# Patient Record
Sex: FEMALE | Race: BLACK OR AFRICAN AMERICAN | Employment: FULL TIME | URBAN - METROPOLITAN AREA
[De-identification: names, ages, dates, MRNs, and addresses within clinical notes are randomized per-mention and may not be internally consistent; named-entity substitution may affect disease eponyms.]

---

## 2017-04-08 ENCOUNTER — EMPLOYEE WELLNESS (OUTPATIENT)
Dept: OTHER | Age: 47
End: 2017-04-08

## 2017-04-08 LAB
CHOLESTEROL, TOTAL: 173 MG/DL (ref 0–199)
FASTING: YES
GLUCOSE BLD-MCNC: 76 MG/DL (ref 74–109)
HDLC SERPL-MCNC: 65 MG/DL (ref 40–90)
LDL CHOLESTEROL CALCULATED: 90 MG/DL
TRIGL SERPL-MCNC: 91 MG/DL (ref 0–199)

## 2018-03-01 ENCOUNTER — OFFICE VISIT (OUTPATIENT)
Dept: ENT CLINIC | Age: 48
End: 2018-03-01
Payer: COMMERCIAL

## 2018-03-01 VITALS
WEIGHT: 153 LBS | RESPIRATION RATE: 16 BRPM | DIASTOLIC BLOOD PRESSURE: 84 MMHG | BODY MASS INDEX: 24.01 KG/M2 | TEMPERATURE: 98.4 F | HEIGHT: 67 IN | SYSTOLIC BLOOD PRESSURE: 126 MMHG | HEART RATE: 78 BPM

## 2018-03-01 DIAGNOSIS — J34.3 HYPERTROPHY OF INFERIOR NASAL TURBINATE: ICD-10-CM

## 2018-03-01 DIAGNOSIS — R09.81 NASAL CONGESTION: Primary | ICD-10-CM

## 2018-03-01 DIAGNOSIS — J34.2 DEVIATED NASAL SEPTUM: ICD-10-CM

## 2018-03-01 DIAGNOSIS — R06.83 SNORING: ICD-10-CM

## 2018-03-01 DIAGNOSIS — J32.9 FREQUENT SINUS INFECTIONS: ICD-10-CM

## 2018-03-01 PROCEDURE — 99203 OFFICE O/P NEW LOW 30 MIN: CPT | Performed by: NURSE PRACTITIONER

## 2018-03-01 RX ORDER — AMOXICILLIN AND CLAVULANATE POTASSIUM 875; 125 MG/1; MG/1
1 TABLET, FILM COATED ORAL 2 TIMES DAILY
Qty: 56 TABLET | Refills: 0 | Status: SHIPPED | OUTPATIENT
Start: 2018-03-01 | End: 2018-03-29

## 2018-03-01 RX ORDER — IBUPROFEN 200 MG
200 TABLET ORAL EVERY 6 HOURS PRN
Status: ON HOLD | COMMUNITY
End: 2018-05-30 | Stop reason: HOSPADM

## 2018-03-01 RX ORDER — LORATADINE 10 MG/1
10 TABLET ORAL DAILY
COMMUNITY
End: 2021-01-14 | Stop reason: ALTCHOICE

## 2018-03-01 ASSESSMENT — ENCOUNTER SYMPTOMS
COUGH: 0
ABDOMINAL PAIN: 0
FACIAL SWELLING: 0
BACK PAIN: 0
CHEST TIGHTNESS: 0
RHINORRHEA: 0
VOMITING: 0
SORE THROAT: 0
EYE PAIN: 0
CHOKING: 0
ABDOMINAL DISTENTION: 0
PHOTOPHOBIA: 0
WHEEZING: 0
EYE DISCHARGE: 0
BLOOD IN STOOL: 0
CONSTIPATION: 0
EYE REDNESS: 0
COLOR CHANGE: 0
SHORTNESS OF BREATH: 0
TROUBLE SWALLOWING: 0
DIARRHEA: 0
ANAL BLEEDING: 0
STRIDOR: 0
VOICE CHANGE: 0
APNEA: 0
EYE ITCHING: 0
RECTAL PAIN: 0
NAUSEA: 0
SINUS PRESSURE: 1

## 2018-03-01 NOTE — PROGRESS NOTES
hypertropy   Discharge:  none    Lips, tongue and oral cavity show tongue is midline, mobile, no lesions. Dentition: good, no malocclusion  Oral mucosa: moist  Tonsils: unremarkable  Oropharynx: normal-appearing mucosa and no pharyngitis, no exudate  Hard and soft palates symmetrical and intact. Uvula midline. Gag reflex present  Nasopharynx: not seen    No facial redness, swelling or tenderness. Salivary glands not enlarged. Neck symmetrical, supple  Cervical adenopathy: no palpable lymphadenopathy  Trachea midline    Chest equal and symmetrical expansion, no retractions    Extremities: no clubbing, cyanosis or edema  Gait steady  Skin: normal exposed surfaces  Cranial nerves grossly intact    Data:  All of the past medical history, past surgical history, family history, social history, allergies and current medications were reviewed. Assessment:   Assessment   1. Nasal congestion  CT Facial Bones WO Contrast   2. Frequent sinus infections  CT Facial Bones WO Contrast   3. Snoring  CT Facial Bones WO Contrast   4. Deviated nasal septum     5. Hypertrophy of inferior nasal turbinate          Plan:        Discussed rationale for treatment of chronic or recurrent sinus infections with prolonged course of broad spectrum antibiotic, nasal steroid spray, and nasal saline irrigation. After 4 week course, will obtain CT sinus and return for appointment with Dr Elliott Tyler. I suspect that if her sinus symptoms clear up, she may still experience nasal obstructive issues due to her deviated septum. Patient is agreeable to plan. Return in about 4 weeks (around 3/29/2018).

## 2018-03-20 VITALS — WEIGHT: 149 LBS | BODY MASS INDEX: 23.34 KG/M2

## 2018-03-30 ENCOUNTER — HOSPITAL ENCOUNTER (OUTPATIENT)
Dept: CT IMAGING | Age: 48
Discharge: HOME | End: 2018-03-30
Payer: COMMERCIAL

## 2018-03-30 DIAGNOSIS — R09.81 NASAL CONGESTION: ICD-10-CM

## 2018-03-30 DIAGNOSIS — R06.83 SNORING: ICD-10-CM

## 2018-03-30 DIAGNOSIS — J32.9 FREQUENT SINUS INFECTIONS: ICD-10-CM

## 2018-03-30 PROCEDURE — 70486 CT MAXILLOFACIAL W/O DYE: CPT

## 2018-04-03 ENCOUNTER — EMPLOYEE WELLNESS (OUTPATIENT)
Dept: OTHER | Age: 48
End: 2018-04-03

## 2018-04-03 LAB
CHOLESTEROL, TOTAL: 188 MG/DL (ref 0–199)
FASTING: YES
GLUCOSE BLD-MCNC: 91 MG/DL (ref 74–109)
HDLC SERPL-MCNC: 62 MG/DL (ref 40–90)
LDL CHOLESTEROL CALCULATED: 99 MG/DL
TRIGL SERPL-MCNC: 137 MG/DL (ref 0–199)

## 2018-04-10 VITALS — WEIGHT: 151 LBS | BODY MASS INDEX: 23.65 KG/M2

## 2018-04-12 ENCOUNTER — OFFICE VISIT (OUTPATIENT)
Dept: ENT CLINIC | Age: 48
End: 2018-04-12
Payer: COMMERCIAL

## 2018-04-12 VITALS
DIASTOLIC BLOOD PRESSURE: 76 MMHG | SYSTOLIC BLOOD PRESSURE: 130 MMHG | RESPIRATION RATE: 16 BRPM | HEIGHT: 67 IN | BODY MASS INDEX: 24.19 KG/M2 | TEMPERATURE: 97.9 F | HEART RATE: 84 BPM | WEIGHT: 154.1 LBS

## 2018-04-12 DIAGNOSIS — J34.2 DEVIATED NASAL SEPTUM: ICD-10-CM

## 2018-04-12 DIAGNOSIS — J34.3 HYPERTROPHY, NASAL, TURBINATE: ICD-10-CM

## 2018-04-12 DIAGNOSIS — J32.9 FREQUENT SINUS INFECTIONS: ICD-10-CM

## 2018-04-12 DIAGNOSIS — R09.81 NASAL CONGESTION: ICD-10-CM

## 2018-04-12 DIAGNOSIS — J32.2 CHRONIC ETHMOIDAL SINUSITIS: Primary | ICD-10-CM

## 2018-04-12 DIAGNOSIS — J32.1 CHRONIC FRONTAL SINUSITIS: ICD-10-CM

## 2018-04-12 DIAGNOSIS — R06.83 SNORING: ICD-10-CM

## 2018-04-12 DIAGNOSIS — J32.0 CHRONIC MAXILLARY SINUSITIS: ICD-10-CM

## 2018-04-12 PROCEDURE — 99214 OFFICE O/P EST MOD 30 MIN: CPT | Performed by: OTOLARYNGOLOGY

## 2018-04-12 ASSESSMENT — ENCOUNTER SYMPTOMS
COUGH: 0
CHEST TIGHTNESS: 0
COLOR CHANGE: 0
FACIAL SWELLING: 0
SHORTNESS OF BREATH: 0
RHINORRHEA: 0
APNEA: 0
VOICE CHANGE: 0
TROUBLE SWALLOWING: 0
NAUSEA: 0
CHOKING: 0
DIARRHEA: 0
VOMITING: 0
STRIDOR: 0
WHEEZING: 0
SINUS PRESSURE: 0
ABDOMINAL PAIN: 0
SORE THROAT: 0

## 2018-05-01 ENCOUNTER — HOSPITAL ENCOUNTER (OUTPATIENT)
Age: 48
Discharge: HOME | End: 2018-05-01
Payer: COMMERCIAL

## 2018-05-01 DIAGNOSIS — J32.0 CHRONIC MAXILLARY SINUSITIS: ICD-10-CM

## 2018-05-01 DIAGNOSIS — J32.2 CHRONIC ETHMOIDAL SINUSITIS: ICD-10-CM

## 2018-05-01 DIAGNOSIS — J32.9 FREQUENT SINUS INFECTIONS: ICD-10-CM

## 2018-05-01 DIAGNOSIS — R06.83 SNORING: ICD-10-CM

## 2018-05-01 DIAGNOSIS — R09.81 NASAL CONGESTION: ICD-10-CM

## 2018-05-01 DIAGNOSIS — J34.2 DEVIATED NASAL SEPTUM: ICD-10-CM

## 2018-05-01 DIAGNOSIS — J32.1 CHRONIC FRONTAL SINUSITIS: ICD-10-CM

## 2018-05-01 DIAGNOSIS — J34.3 HYPERTROPHY, NASAL, TURBINATE: ICD-10-CM

## 2018-05-01 LAB
BASOPHILS # BLD: 0.4 %
BASOPHILS ABSOLUTE: 0 THOU/MM3 (ref 0–0.1)
EOSINOPHIL # BLD: 2.8 %
EOSINOPHILS ABSOLUTE: 0.2 THOU/MM3 (ref 0–0.4)
HCT VFR BLD CALC: 41.2 % (ref 37–47)
HEMOGLOBIN: 14 GM/DL (ref 12–16)
LYMPHOCYTES # BLD: 25.3 %
LYMPHOCYTES ABSOLUTE: 1.7 THOU/MM3 (ref 1–4.8)
MCH RBC QN AUTO: 30 PG (ref 27–31)
MCHC RBC AUTO-ENTMCNC: 33.9 GM/DL (ref 33–37)
MCV RBC AUTO: 88.6 FL (ref 81–99)
MONOCYTES # BLD: 7.6 %
MONOCYTES ABSOLUTE: 0.5 THOU/MM3 (ref 0.4–1.3)
NUCLEATED RED BLOOD CELLS: 0 /100 WBC
PDW BLD-RTO: 13.5 % (ref 11.5–14.5)
PLATELET # BLD: 351 THOU/MM3 (ref 130–400)
PMV BLD AUTO: 7.3 FL (ref 7.4–10.4)
RBC # BLD: 4.65 MILL/MM3 (ref 4.2–5.4)
SEG NEUTROPHILS: 63.9 %
SEGMENTED NEUTROPHILS ABSOLUTE COUNT: 4.2 THOU/MM3 (ref 1.8–7.7)
WBC # BLD: 6.6 THOU/MM3 (ref 4.8–10.8)

## 2018-05-01 PROCEDURE — 36415 COLL VENOUS BLD VENIPUNCTURE: CPT

## 2018-05-01 PROCEDURE — 85025 COMPLETE CBC W/AUTO DIFF WBC: CPT

## 2018-05-21 ENCOUNTER — OFFICE VISIT (OUTPATIENT)
Dept: ENT CLINIC | Age: 48
End: 2018-05-21

## 2018-05-21 VITALS
HEART RATE: 78 BPM | BODY MASS INDEX: 24.06 KG/M2 | SYSTOLIC BLOOD PRESSURE: 120 MMHG | WEIGHT: 153.3 LBS | DIASTOLIC BLOOD PRESSURE: 76 MMHG | HEIGHT: 67 IN | TEMPERATURE: 98.2 F | RESPIRATION RATE: 16 BRPM

## 2018-05-21 DIAGNOSIS — R06.83 SNORING: ICD-10-CM

## 2018-05-21 DIAGNOSIS — R09.81 NASAL CONGESTION: ICD-10-CM

## 2018-05-21 DIAGNOSIS — J32.0 CHRONIC MAXILLARY SINUSITIS: ICD-10-CM

## 2018-05-21 DIAGNOSIS — J34.2 DEVIATED NASAL SEPTUM: ICD-10-CM

## 2018-05-21 DIAGNOSIS — J34.3 HYPERTROPHY, NASAL, TURBINATE: ICD-10-CM

## 2018-05-21 DIAGNOSIS — J32.1 CHRONIC FRONTAL SINUSITIS: ICD-10-CM

## 2018-05-21 DIAGNOSIS — J32.2 CHRONIC ETHMOIDAL SINUSITIS: Primary | ICD-10-CM

## 2018-05-21 PROCEDURE — PREOPEXAM PRE-OP EXAM: Performed by: NURSE PRACTITIONER

## 2018-05-21 ASSESSMENT — ENCOUNTER SYMPTOMS
ANAL BLEEDING: 0
STRIDOR: 0
COLOR CHANGE: 0
EYE PAIN: 0
DIARRHEA: 0
FACIAL SWELLING: 0
BACK PAIN: 0
VOMITING: 0
ABDOMINAL PAIN: 0
EYE DISCHARGE: 0
RECTAL PAIN: 0
EYE REDNESS: 0
PHOTOPHOBIA: 0
VOICE CHANGE: 0
CONSTIPATION: 0
CHEST TIGHTNESS: 0
CHOKING: 0
ABDOMINAL DISTENTION: 0
SORE THROAT: 0
APNEA: 0
NAUSEA: 0
COUGH: 0
WHEEZING: 0
BLOOD IN STOOL: 0
EYE ITCHING: 0
SHORTNESS OF BREATH: 0
SINUS PRESSURE: 0
RHINORRHEA: 0
TROUBLE SWALLOWING: 0

## 2018-05-24 ENCOUNTER — TELEPHONE (OUTPATIENT)
Dept: ENT CLINIC | Age: 48
End: 2018-05-24

## 2018-05-29 ENCOUNTER — TELEPHONE (OUTPATIENT)
Dept: ENT CLINIC | Age: 48
End: 2018-05-29

## 2018-05-30 ENCOUNTER — HOSPITAL ENCOUNTER (OUTPATIENT)
Age: 48
Setting detail: OUTPATIENT SURGERY
Discharge: HOME | End: 2018-05-30
Attending: OTOLARYNGOLOGY | Admitting: OTOLARYNGOLOGY
Payer: COMMERCIAL

## 2018-05-30 ENCOUNTER — ANESTHESIA (OUTPATIENT)
Dept: OPERATING ROOM | Age: 48
End: 2018-05-30
Payer: COMMERCIAL

## 2018-05-30 ENCOUNTER — ANESTHESIA EVENT (OUTPATIENT)
Dept: OPERATING ROOM | Age: 48
End: 2018-05-30
Payer: COMMERCIAL

## 2018-05-30 VITALS
HEIGHT: 67 IN | TEMPERATURE: 97.7 F | RESPIRATION RATE: 18 BRPM | SYSTOLIC BLOOD PRESSURE: 162 MMHG | OXYGEN SATURATION: 97 % | WEIGHT: 152 LBS | DIASTOLIC BLOOD PRESSURE: 99 MMHG | HEART RATE: 98 BPM | BODY MASS INDEX: 23.86 KG/M2

## 2018-05-30 VITALS
TEMPERATURE: 68.2 F | DIASTOLIC BLOOD PRESSURE: 54 MMHG | RESPIRATION RATE: 15 BRPM | OXYGEN SATURATION: 100 % | SYSTOLIC BLOOD PRESSURE: 98 MMHG

## 2018-05-30 DIAGNOSIS — J32.2 CHRONIC ETHMOIDAL SINUSITIS: ICD-10-CM

## 2018-05-30 DIAGNOSIS — J34.2 DEVIATED NASAL SEPTUM: Primary | ICD-10-CM

## 2018-05-30 DIAGNOSIS — J32.1 CHRONIC FRONTAL SINUSITIS: ICD-10-CM

## 2018-05-30 DIAGNOSIS — J34.3 HYPERTROPHY, NASAL, TURBINATE: ICD-10-CM

## 2018-05-30 DIAGNOSIS — J32.0 CHRONIC MAXILLARY SINUSITIS: ICD-10-CM

## 2018-05-30 PROCEDURE — 3700000000 HC ANESTHESIA ATTENDED CARE: Performed by: OTOLARYNGOLOGY

## 2018-05-30 PROCEDURE — 61782 SCAN PROC CRANIAL EXTRA: CPT | Performed by: OTOLARYNGOLOGY

## 2018-05-30 PROCEDURE — 30520 REPAIR OF NASAL SEPTUM: CPT | Performed by: OTOLARYNGOLOGY

## 2018-05-30 PROCEDURE — 3600000014 HC SURGERY LEVEL 4 ADDTL 15MIN: Performed by: OTOLARYNGOLOGY

## 2018-05-30 PROCEDURE — 7100000000 HC PACU RECOVERY - FIRST 15 MIN: Performed by: OTOLARYNGOLOGY

## 2018-05-30 PROCEDURE — 7100000011 HC PHASE II RECOVERY - ADDTL 15 MIN: Performed by: OTOLARYNGOLOGY

## 2018-05-30 PROCEDURE — 2500000003 HC RX 250 WO HCPCS: Performed by: OTOLARYNGOLOGY

## 2018-05-30 PROCEDURE — 7100000001 HC PACU RECOVERY - ADDTL 15 MIN: Performed by: OTOLARYNGOLOGY

## 2018-05-30 PROCEDURE — 6370000000 HC RX 637 (ALT 250 FOR IP): Performed by: SPECIALIST

## 2018-05-30 PROCEDURE — 2580000003 HC RX 258: Performed by: OTOLARYNGOLOGY

## 2018-05-30 PROCEDURE — 6360000002 HC RX W HCPCS: Performed by: ANESTHESIOLOGY

## 2018-05-30 PROCEDURE — 2500000003 HC RX 250 WO HCPCS: Performed by: SPECIALIST

## 2018-05-30 PROCEDURE — 6370000000 HC RX 637 (ALT 250 FOR IP): Performed by: OTOLARYNGOLOGY

## 2018-05-30 PROCEDURE — 6360000002 HC RX W HCPCS: Performed by: OTOLARYNGOLOGY

## 2018-05-30 PROCEDURE — 6360000002 HC RX W HCPCS: Performed by: SPECIALIST

## 2018-05-30 PROCEDURE — 7100000010 HC PHASE II RECOVERY - FIRST 15 MIN: Performed by: OTOLARYNGOLOGY

## 2018-05-30 PROCEDURE — 2580000003 HC RX 258: Performed by: SPECIALIST

## 2018-05-30 PROCEDURE — 31253 NSL/SINS NDSC TOTAL: CPT | Performed by: OTOLARYNGOLOGY

## 2018-05-30 PROCEDURE — 6370000000 HC RX 637 (ALT 250 FOR IP)

## 2018-05-30 PROCEDURE — 3700000001 HC ADD 15 MINUTES (ANESTHESIA): Performed by: OTOLARYNGOLOGY

## 2018-05-30 PROCEDURE — C2625 STENT, NON-COR, TEM W/DEL SY: HCPCS | Performed by: OTOLARYNGOLOGY

## 2018-05-30 PROCEDURE — 88304 TISSUE EXAM BY PATHOLOGIST: CPT

## 2018-05-30 PROCEDURE — 30140 RESECT INFERIOR TURBINATE: CPT | Performed by: OTOLARYNGOLOGY

## 2018-05-30 PROCEDURE — 31020 EXPLORATION MAXILLARY SINUS: CPT | Performed by: OTOLARYNGOLOGY

## 2018-05-30 PROCEDURE — 2780000010 HC IMPLANT OTHER: Performed by: OTOLARYNGOLOGY

## 2018-05-30 PROCEDURE — 3600000004 HC SURGERY LEVEL 4 BASE: Performed by: OTOLARYNGOLOGY

## 2018-05-30 PROCEDURE — 2720000010 HC SURG SUPPLY STERILE: Performed by: OTOLARYNGOLOGY

## 2018-05-30 DEVICE — AGENT HEMOSTATIC SURGIFLOW MATRIX KIT W/THROMBIN: Type: IMPLANTABLE DEVICE | Status: FUNCTIONAL

## 2018-05-30 DEVICE — PROPEL MINI SINUS IMPLANT
Type: IMPLANTABLE DEVICE | Status: FUNCTIONAL
Brand: PROPEL MINI

## 2018-05-30 RX ORDER — ONDANSETRON 2 MG/ML
INJECTION INTRAMUSCULAR; INTRAVENOUS PRN
Status: DISCONTINUED | OUTPATIENT
Start: 2018-05-30 | End: 2018-05-30 | Stop reason: SDUPTHER

## 2018-05-30 RX ORDER — HYDROMORPHONE HCL 110MG/55ML
PATIENT CONTROLLED ANALGESIA SYRINGE INTRAVENOUS PRN
Status: DISCONTINUED | OUTPATIENT
Start: 2018-05-30 | End: 2018-05-30 | Stop reason: SDUPTHER

## 2018-05-30 RX ORDER — PSEUDOEPHEDRINE HYDROCHLORIDE 30 MG/1
30 TABLET ORAL EVERY 6 HOURS
Qty: 56 TABLET | Refills: 0 | Status: SHIPPED | OUTPATIENT
Start: 2018-05-30 | End: 2018-06-13

## 2018-05-30 RX ORDER — HYDROCODONE BITARTRATE AND ACETAMINOPHEN 7.5; 325 MG/1; MG/1
TABLET ORAL
Status: COMPLETED
Start: 2018-05-30 | End: 2018-05-30

## 2018-05-30 RX ORDER — LIDOCAINE HYDROCHLORIDE AND EPINEPHRINE 10; 10 MG/ML; UG/ML
INJECTION, SOLUTION INFILTRATION; PERINEURAL PRN
Status: DISCONTINUED | OUTPATIENT
Start: 2018-05-30 | End: 2018-05-30 | Stop reason: HOSPADM

## 2018-05-30 RX ORDER — PROPOFOL 10 MG/ML
INJECTION, EMULSION INTRAVENOUS PRN
Status: DISCONTINUED | OUTPATIENT
Start: 2018-05-30 | End: 2018-05-30 | Stop reason: SDUPTHER

## 2018-05-30 RX ORDER — GINSENG 100 MG
CAPSULE ORAL PRN
Status: DISCONTINUED | OUTPATIENT
Start: 2018-05-30 | End: 2018-05-30 | Stop reason: HOSPADM

## 2018-05-30 RX ORDER — SODIUM CHLORIDE 9 MG/ML
INJECTION, SOLUTION INTRAVENOUS CONTINUOUS
Status: DISCONTINUED | OUTPATIENT
Start: 2018-05-30 | End: 2018-05-30 | Stop reason: HOSPADM

## 2018-05-30 RX ORDER — ESMOLOL HYDROCHLORIDE 10 MG/ML
INJECTION INTRAVENOUS PRN
Status: DISCONTINUED | OUTPATIENT
Start: 2018-05-30 | End: 2018-05-30 | Stop reason: SDUPTHER

## 2018-05-30 RX ORDER — HYDROCODONE BITARTRATE AND ACETAMINOPHEN 7.5; 325 MG/1; MG/1
1 TABLET ORAL EVERY 6 HOURS PRN
Qty: 20 TABLET | Refills: 0 | Status: SHIPPED | OUTPATIENT
Start: 2018-05-30 | End: 2018-06-04

## 2018-05-30 RX ORDER — DEXAMETHASONE SODIUM PHOSPHATE 4 MG/ML
12 INJECTION, SOLUTION INTRA-ARTICULAR; INTRALESIONAL; INTRAMUSCULAR; INTRAVENOUS; SOFT TISSUE
Status: DISCONTINUED | OUTPATIENT
Start: 2018-05-30 | End: 2018-05-30 | Stop reason: HOSPADM

## 2018-05-30 RX ORDER — DEXAMETHASONE SODIUM PHOSPHATE 4 MG/ML
INJECTION, SOLUTION INTRA-ARTICULAR; INTRALESIONAL; INTRAMUSCULAR; INTRAVENOUS; SOFT TISSUE PRN
Status: DISCONTINUED | OUTPATIENT
Start: 2018-05-30 | End: 2018-05-30 | Stop reason: HOSPADM

## 2018-05-30 RX ORDER — FENTANYL CITRATE 50 UG/ML
50 INJECTION, SOLUTION INTRAMUSCULAR; INTRAVENOUS EVERY 5 MIN PRN
Status: DISCONTINUED | OUTPATIENT
Start: 2018-05-30 | End: 2018-05-30 | Stop reason: HOSPADM

## 2018-05-30 RX ORDER — ROCURONIUM BROMIDE 10 MG/ML
INJECTION, SOLUTION INTRAVENOUS PRN
Status: DISCONTINUED | OUTPATIENT
Start: 2018-05-30 | End: 2018-05-30 | Stop reason: SDUPTHER

## 2018-05-30 RX ORDER — PREDNISONE 20 MG/1
20 TABLET ORAL DAILY
Qty: 4 TABLET | Refills: 0 | Status: SHIPPED | OUTPATIENT
Start: 2018-05-30 | End: 2018-06-02

## 2018-05-30 RX ORDER — MINERAL OIL AND WHITE PETROLATUM 150; 830 MG/G; MG/G
OINTMENT OPHTHALMIC PRN
Status: DISCONTINUED | OUTPATIENT
Start: 2018-05-30 | End: 2018-05-30 | Stop reason: SDUPTHER

## 2018-05-30 RX ORDER — GLYCOPYRROLATE 1 MG/5 ML
SYRINGE (ML) INTRAVENOUS PRN
Status: DISCONTINUED | OUTPATIENT
Start: 2018-05-30 | End: 2018-05-30 | Stop reason: SDUPTHER

## 2018-05-30 RX ORDER — PROMETHAZINE HYDROCHLORIDE 25 MG/ML
12.5 INJECTION, SOLUTION INTRAMUSCULAR; INTRAVENOUS
Status: COMPLETED | OUTPATIENT
Start: 2018-05-30 | End: 2018-05-30

## 2018-05-30 RX ORDER — LABETALOL HYDROCHLORIDE 5 MG/ML
10 INJECTION, SOLUTION INTRAVENOUS EVERY 10 MIN PRN
Status: DISCONTINUED | OUTPATIENT
Start: 2018-05-30 | End: 2018-05-30 | Stop reason: HOSPADM

## 2018-05-30 RX ORDER — MIDAZOLAM HYDROCHLORIDE 1 MG/ML
INJECTION INTRAMUSCULAR; INTRAVENOUS PRN
Status: DISCONTINUED | OUTPATIENT
Start: 2018-05-30 | End: 2018-05-30 | Stop reason: SDUPTHER

## 2018-05-30 RX ORDER — HYDROCODONE BITARTRATE AND ACETAMINOPHEN 7.5; 325 MG/1; MG/1
1 TABLET ORAL EVERY 6 HOURS PRN
Status: DISCONTINUED | OUTPATIENT
Start: 2018-05-30 | End: 2018-05-30 | Stop reason: HOSPADM

## 2018-05-30 RX ORDER — SODIUM CHLORIDE, SODIUM LACTATE, POTASSIUM CHLORIDE, CALCIUM CHLORIDE 600; 310; 30; 20 MG/100ML; MG/100ML; MG/100ML; MG/100ML
INJECTION, SOLUTION INTRAVENOUS CONTINUOUS PRN
Status: DISCONTINUED | OUTPATIENT
Start: 2018-05-30 | End: 2018-05-30 | Stop reason: SDUPTHER

## 2018-05-30 RX ORDER — ROPIVACAINE HYDROCHLORIDE 5 MG/ML
INJECTION, SOLUTION EPIDURAL; INFILTRATION; PERINEURAL PRN
Status: DISCONTINUED | OUTPATIENT
Start: 2018-05-30 | End: 2018-05-30 | Stop reason: HOSPADM

## 2018-05-30 RX ORDER — OXYMETAZOLINE HYDROCHLORIDE 0.05 G/100ML
SPRAY NASAL PRN
Status: DISCONTINUED | OUTPATIENT
Start: 2018-05-30 | End: 2018-05-30 | Stop reason: HOSPADM

## 2018-05-30 RX ORDER — LIDOCAINE HYDROCHLORIDE 20 MG/ML
INJECTION, SOLUTION INFILTRATION; PERINEURAL PRN
Status: DISCONTINUED | OUTPATIENT
Start: 2018-05-30 | End: 2018-05-30 | Stop reason: SDUPTHER

## 2018-05-30 RX ORDER — FENTANYL CITRATE 50 UG/ML
INJECTION, SOLUTION INTRAMUSCULAR; INTRAVENOUS PRN
Status: DISCONTINUED | OUTPATIENT
Start: 2018-05-30 | End: 2018-05-30 | Stop reason: SDUPTHER

## 2018-05-30 RX ORDER — FENTANYL CITRATE 50 UG/ML
25 INJECTION, SOLUTION INTRAMUSCULAR; INTRAVENOUS EVERY 5 MIN PRN
Status: DISCONTINUED | OUTPATIENT
Start: 2018-05-30 | End: 2018-05-30 | Stop reason: HOSPADM

## 2018-05-30 RX ADMIN — ESMOLOL HYDROCHLORIDE 20 MG: 10 INJECTION, SOLUTION INTRAVENOUS at 11:24

## 2018-05-30 RX ADMIN — ROCURONIUM BROMIDE 50 MG: 10 INJECTION, SOLUTION INTRAVENOUS at 11:02

## 2018-05-30 RX ADMIN — ROCURONIUM BROMIDE 10 MG: 10 INJECTION, SOLUTION INTRAVENOUS at 11:56

## 2018-05-30 RX ADMIN — FENTANYL CITRATE 25 MCG: 50 INJECTION, SOLUTION INTRAMUSCULAR; INTRAVENOUS at 16:26

## 2018-05-30 RX ADMIN — HYDROCODONE BITARTRATE AND ACETAMINOPHEN 1 TABLET: 7.5; 325 TABLET ORAL at 18:22

## 2018-05-30 RX ADMIN — FENTANYL CITRATE 50 MCG: 50 INJECTION INTRAMUSCULAR; INTRAVENOUS at 13:05

## 2018-05-30 RX ADMIN — FENTANYL CITRATE 25 MCG: 50 INJECTION, SOLUTION INTRAMUSCULAR; INTRAVENOUS at 16:20

## 2018-05-30 RX ADMIN — Medication 0.2 MG: at 10:54

## 2018-05-30 RX ADMIN — FENTANYL CITRATE 50 MCG: 50 INJECTION, SOLUTION INTRAMUSCULAR; INTRAVENOUS at 16:13

## 2018-05-30 RX ADMIN — SODIUM CHLORIDE: 9 INJECTION, SOLUTION INTRAVENOUS at 10:19

## 2018-05-30 RX ADMIN — PROPOFOL 50 MG: 10 INJECTION, EMULSION INTRAVENOUS at 11:16

## 2018-05-30 RX ADMIN — ONDANSETRON HYDROCHLORIDE 4 MG: 4 INJECTION, SOLUTION INTRAMUSCULAR; INTRAVENOUS at 14:34

## 2018-05-30 RX ADMIN — FENTANYL CITRATE 50 MCG: 50 INJECTION INTRAMUSCULAR; INTRAVENOUS at 10:52

## 2018-05-30 RX ADMIN — PROPOFOL 150 MG: 10 INJECTION, EMULSION INTRAVENOUS at 11:02

## 2018-05-30 RX ADMIN — SODIUM CHLORIDE: 9 INJECTION, SOLUTION INTRAVENOUS at 12:30

## 2018-05-30 RX ADMIN — MIDAZOLAM HYDROCHLORIDE 2 MG: 1 INJECTION, SOLUTION INTRAMUSCULAR; INTRAVENOUS at 10:54

## 2018-05-30 RX ADMIN — MINERAL OIL AND WHITE PETROLATUM 1 INCH: 150; 830 OINTMENT OPHTHALMIC at 11:04

## 2018-05-30 RX ADMIN — FENTANYL CITRATE 50 MCG: 50 INJECTION INTRAMUSCULAR; INTRAVENOUS at 11:02

## 2018-05-30 RX ADMIN — FENTANYL CITRATE 50 MCG: 50 INJECTION INTRAMUSCULAR; INTRAVENOUS at 11:12

## 2018-05-30 RX ADMIN — SODIUM CHLORIDE, POTASSIUM CHLORIDE, SODIUM LACTATE AND CALCIUM CHLORIDE: 600; 310; 30; 20 INJECTION, SOLUTION INTRAVENOUS at 15:30

## 2018-05-30 RX ADMIN — HYDROMORPHONE HYDROCHLORIDE 0.5 MG: 2 INJECTION INTRAMUSCULAR; INTRAVENOUS; SUBCUTANEOUS at 14:42

## 2018-05-30 RX ADMIN — LIDOCAINE HYDROCHLORIDE 100 MG: 20 INJECTION, SOLUTION INFILTRATION; PERINEURAL at 11:02

## 2018-05-30 RX ADMIN — HYDROMORPHONE HYDROCHLORIDE 0.5 MG: 2 INJECTION INTRAMUSCULAR; INTRAVENOUS; SUBCUTANEOUS at 15:05

## 2018-05-30 RX ADMIN — FENTANYL CITRATE 50 MCG: 50 INJECTION INTRAMUSCULAR; INTRAVENOUS at 11:57

## 2018-05-30 RX ADMIN — ESMOLOL HYDROCHLORIDE 10 MG: 10 INJECTION, SOLUTION INTRAVENOUS at 11:37

## 2018-05-30 RX ADMIN — DEXAMETHASONE SODIUM PHOSPHATE 12 MG: 4 INJECTION, SOLUTION INTRA-ARTICULAR; INTRALESIONAL; INTRAMUSCULAR; INTRAVENOUS; SOFT TISSUE at 10:47

## 2018-05-30 RX ADMIN — PROMETHAZINE HYDROCHLORIDE 12.5 MG: 25 INJECTION INTRAMUSCULAR; INTRAVENOUS at 17:33

## 2018-05-30 ASSESSMENT — PULMONARY FUNCTION TESTS
PIF_VALUE: 15
PIF_VALUE: 21
PIF_VALUE: 22
PIF_VALUE: 21
PIF_VALUE: 21
PIF_VALUE: 22
PIF_VALUE: 20
PIF_VALUE: 22
PIF_VALUE: 20
PIF_VALUE: 21
PIF_VALUE: 22
PIF_VALUE: 21
PIF_VALUE: 22
PIF_VALUE: 22
PIF_VALUE: 21
PIF_VALUE: 15
PIF_VALUE: 4
PIF_VALUE: 21
PIF_VALUE: 15
PIF_VALUE: 22
PIF_VALUE: 21
PIF_VALUE: 22
PIF_VALUE: 21
PIF_VALUE: 16
PIF_VALUE: 19
PIF_VALUE: 22
PIF_VALUE: 20
PIF_VALUE: 21
PIF_VALUE: 22
PIF_VALUE: 21
PIF_VALUE: 20
PIF_VALUE: 0
PIF_VALUE: 19
PIF_VALUE: 21
PIF_VALUE: 22
PIF_VALUE: 20
PIF_VALUE: 15
PIF_VALUE: 21
PIF_VALUE: 22
PIF_VALUE: 21
PIF_VALUE: 16
PIF_VALUE: 16
PIF_VALUE: 21
PIF_VALUE: 15
PIF_VALUE: 21
PIF_VALUE: 0
PIF_VALUE: 21
PIF_VALUE: 21
PIF_VALUE: 20
PIF_VALUE: 21
PIF_VALUE: 22
PIF_VALUE: 21
PIF_VALUE: 15
PIF_VALUE: 21
PIF_VALUE: 22
PIF_VALUE: 21
PIF_VALUE: 20
PIF_VALUE: 4
PIF_VALUE: 21
PIF_VALUE: 22
PIF_VALUE: 15
PIF_VALUE: 15
PIF_VALUE: 21
PIF_VALUE: 21
PIF_VALUE: 22
PIF_VALUE: 15
PIF_VALUE: 19
PIF_VALUE: 21
PIF_VALUE: 21
PIF_VALUE: 20
PIF_VALUE: 21
PIF_VALUE: 15
PIF_VALUE: 21
PIF_VALUE: 19
PIF_VALUE: 10
PIF_VALUE: 21
PIF_VALUE: 15
PIF_VALUE: 21
PIF_VALUE: 21
PIF_VALUE: 20
PIF_VALUE: 21
PIF_VALUE: 22
PIF_VALUE: 21
PIF_VALUE: 10
PIF_VALUE: 21
PIF_VALUE: 16
PIF_VALUE: 21
PIF_VALUE: 15
PIF_VALUE: 20
PIF_VALUE: 21
PIF_VALUE: 20
PIF_VALUE: 15
PIF_VALUE: 21
PIF_VALUE: 21
PIF_VALUE: 15
PIF_VALUE: 21
PIF_VALUE: 15
PIF_VALUE: 20
PIF_VALUE: 22
PIF_VALUE: 21
PIF_VALUE: 21
PIF_VALUE: 22
PIF_VALUE: 19
PIF_VALUE: 15
PIF_VALUE: 22
PIF_VALUE: 15
PIF_VALUE: 19
PIF_VALUE: 15
PIF_VALUE: 21
PIF_VALUE: 22
PIF_VALUE: 22
PIF_VALUE: 16
PIF_VALUE: 9
PIF_VALUE: 21
PIF_VALUE: 20
PIF_VALUE: 21
PIF_VALUE: 15
PIF_VALUE: 19
PIF_VALUE: 21
PIF_VALUE: 20
PIF_VALUE: 16
PIF_VALUE: 21
PIF_VALUE: 15
PIF_VALUE: 21
PIF_VALUE: 21
PIF_VALUE: 20
PIF_VALUE: 20
PIF_VALUE: 15
PIF_VALUE: 22
PIF_VALUE: 15
PIF_VALUE: 15
PIF_VALUE: 21
PIF_VALUE: 15
PIF_VALUE: 20
PIF_VALUE: 22
PIF_VALUE: 19
PIF_VALUE: 21
PIF_VALUE: 20
PIF_VALUE: 21
PIF_VALUE: 15
PIF_VALUE: 21
PIF_VALUE: 22
PIF_VALUE: 21
PIF_VALUE: 22
PIF_VALUE: 21
PIF_VALUE: 17
PIF_VALUE: 21
PIF_VALUE: 16
PIF_VALUE: 21
PIF_VALUE: 22
PIF_VALUE: 22
PIF_VALUE: 21
PIF_VALUE: 15
PIF_VALUE: 21
PIF_VALUE: 15
PIF_VALUE: 21
PIF_VALUE: 21
PIF_VALUE: 15
PIF_VALUE: 21
PIF_VALUE: 16
PIF_VALUE: 22
PIF_VALUE: 15
PIF_VALUE: 21
PIF_VALUE: 21
PIF_VALUE: 15
PIF_VALUE: 21
PIF_VALUE: 22
PIF_VALUE: 21
PIF_VALUE: 22
PIF_VALUE: 21
PIF_VALUE: 21
PIF_VALUE: 22
PIF_VALUE: 22
PIF_VALUE: 21
PIF_VALUE: 15
PIF_VALUE: 15
PIF_VALUE: 22
PIF_VALUE: 21
PIF_VALUE: 22
PIF_VALUE: 21
PIF_VALUE: 20
PIF_VALUE: 15
PIF_VALUE: 15
PIF_VALUE: 21
PIF_VALUE: 20
PIF_VALUE: 22
PIF_VALUE: 21
PIF_VALUE: 1
PIF_VALUE: 21
PIF_VALUE: 21
PIF_VALUE: 20
PIF_VALUE: 21
PIF_VALUE: 22
PIF_VALUE: 21
PIF_VALUE: 20
PIF_VALUE: 21
PIF_VALUE: 16
PIF_VALUE: 23
PIF_VALUE: 21
PIF_VALUE: 15
PIF_VALUE: 20
PIF_VALUE: 21
PIF_VALUE: 21
PIF_VALUE: 15
PIF_VALUE: 16
PIF_VALUE: 21
PIF_VALUE: 19
PIF_VALUE: 21
PIF_VALUE: 22
PIF_VALUE: 21
PIF_VALUE: 22
PIF_VALUE: 21
PIF_VALUE: 21
PIF_VALUE: 22
PIF_VALUE: 22
PIF_VALUE: 21

## 2018-05-30 ASSESSMENT — PAIN SCALES - GENERAL
PAINLEVEL_OUTOF10: 5
PAINLEVEL_OUTOF10: 3
PAINLEVEL_OUTOF10: 7
PAINLEVEL_OUTOF10: 7
PAINLEVEL_OUTOF10: 6
PAINLEVEL_OUTOF10: 5

## 2018-05-30 ASSESSMENT — PAIN DESCRIPTION - PAIN TYPE: TYPE: SURGICAL PAIN

## 2018-05-31 ENCOUNTER — OFFICE VISIT (OUTPATIENT)
Dept: ENT CLINIC | Age: 48
End: 2018-05-31

## 2018-05-31 VITALS
TEMPERATURE: 97.9 F | HEART RATE: 96 BPM | WEIGHT: 153.3 LBS | DIASTOLIC BLOOD PRESSURE: 76 MMHG | BODY MASS INDEX: 24.06 KG/M2 | RESPIRATION RATE: 16 BRPM | SYSTOLIC BLOOD PRESSURE: 110 MMHG | HEIGHT: 67 IN

## 2018-05-31 DIAGNOSIS — J32.0 CHRONIC MAXILLARY SINUSITIS: ICD-10-CM

## 2018-05-31 DIAGNOSIS — J34.2 DEVIATED NASAL SEPTUM: ICD-10-CM

## 2018-05-31 DIAGNOSIS — J32.1 CHRONIC FRONTAL SINUSITIS: ICD-10-CM

## 2018-05-31 DIAGNOSIS — J32.2 CHRONIC ETHMOIDAL SINUSITIS: Primary | ICD-10-CM

## 2018-05-31 DIAGNOSIS — J34.3 HYPERTROPHY OF INFERIOR NASAL TURBINATE: ICD-10-CM

## 2018-05-31 DIAGNOSIS — J34.3 HYPERTROPHY, NASAL, TURBINATE: ICD-10-CM

## 2018-05-31 PROCEDURE — 99024 POSTOP FOLLOW-UP VISIT: CPT | Performed by: OTOLARYNGOLOGY

## 2018-05-31 ASSESSMENT — ENCOUNTER SYMPTOMS
STRIDOR: 0
NAUSEA: 0
SINUS PRESSURE: 0
APNEA: 0
FACIAL SWELLING: 0
CHEST TIGHTNESS: 0
SORE THROAT: 0
WHEEZING: 0
COUGH: 0
COLOR CHANGE: 0
RHINORRHEA: 0
SHORTNESS OF BREATH: 0
VOICE CHANGE: 0
DIARRHEA: 0
TROUBLE SWALLOWING: 0
VOMITING: 0
ABDOMINAL PAIN: 0
CHOKING: 0

## 2018-06-07 ENCOUNTER — OFFICE VISIT (OUTPATIENT)
Dept: ENT CLINIC | Age: 48
End: 2018-06-07

## 2018-06-07 VITALS
HEART RATE: 76 BPM | RESPIRATION RATE: 16 BRPM | WEIGHT: 153.2 LBS | TEMPERATURE: 97.8 F | DIASTOLIC BLOOD PRESSURE: 74 MMHG | BODY MASS INDEX: 23.99 KG/M2 | SYSTOLIC BLOOD PRESSURE: 122 MMHG

## 2018-06-07 DIAGNOSIS — J32.2 CHRONIC ETHMOIDAL SINUSITIS: ICD-10-CM

## 2018-06-07 DIAGNOSIS — J32.0 CHRONIC MAXILLARY SINUSITIS: ICD-10-CM

## 2018-06-07 DIAGNOSIS — J34.2 DEVIATED NASAL SEPTUM: Primary | ICD-10-CM

## 2018-06-07 DIAGNOSIS — J34.3 HYPERTROPHY, NASAL, TURBINATE: ICD-10-CM

## 2018-06-07 DIAGNOSIS — J32.1 CHRONIC FRONTAL SINUSITIS: ICD-10-CM

## 2018-06-07 PROCEDURE — 99024 POSTOP FOLLOW-UP VISIT: CPT | Performed by: OTOLARYNGOLOGY

## 2018-06-07 ASSESSMENT — ENCOUNTER SYMPTOMS
ABDOMINAL PAIN: 0
COUGH: 0
WHEEZING: 0
COLOR CHANGE: 0
CHOKING: 0
VOICE CHANGE: 0
FACIAL SWELLING: 0
SINUS PRESSURE: 0
RHINORRHEA: 0
TROUBLE SWALLOWING: 0
DIARRHEA: 0
VOMITING: 0
STRIDOR: 0
CHEST TIGHTNESS: 0
NAUSEA: 0
SORE THROAT: 0
SHORTNESS OF BREATH: 0
APNEA: 0

## 2018-06-14 ENCOUNTER — OFFICE VISIT (OUTPATIENT)
Dept: ENT CLINIC | Age: 48
End: 2018-06-14
Payer: COMMERCIAL

## 2018-06-14 VITALS
DIASTOLIC BLOOD PRESSURE: 70 MMHG | BODY MASS INDEX: 23.86 KG/M2 | SYSTOLIC BLOOD PRESSURE: 124 MMHG | TEMPERATURE: 98.8 F | HEIGHT: 67 IN | WEIGHT: 152 LBS | RESPIRATION RATE: 14 BRPM | HEART RATE: 88 BPM

## 2018-06-14 DIAGNOSIS — Z98.890 STATUS POST FUNCTIONAL ENDOSCOPIC SINUS SURGERY: ICD-10-CM

## 2018-06-14 DIAGNOSIS — J32.2 CHRONIC ETHMOIDAL SINUSITIS: Primary | ICD-10-CM

## 2018-06-14 DIAGNOSIS — J34.2 DEVIATED NASAL SEPTUM: ICD-10-CM

## 2018-06-14 DIAGNOSIS — J34.3 HYPERTROPHY OF INFERIOR NASAL TURBINATE: ICD-10-CM

## 2018-06-14 DIAGNOSIS — R09.81 NASAL CONGESTION: ICD-10-CM

## 2018-06-14 DIAGNOSIS — J32.1 CHRONIC FRONTAL SINUSITIS: ICD-10-CM

## 2018-06-14 DIAGNOSIS — R06.83 SNORING: ICD-10-CM

## 2018-06-14 DIAGNOSIS — J32.0 CHRONIC MAXILLARY SINUSITIS: ICD-10-CM

## 2018-06-14 DIAGNOSIS — J32.9 FREQUENT SINUS INFECTIONS: ICD-10-CM

## 2018-06-14 DIAGNOSIS — J34.3 HYPERTROPHY, NASAL, TURBINATE: ICD-10-CM

## 2018-06-14 PROCEDURE — 99024 POSTOP FOLLOW-UP VISIT: CPT | Performed by: OTOLARYNGOLOGY

## 2018-06-14 PROCEDURE — 31237 NSL/SINS NDSC SURG BX POLYPC: CPT | Performed by: OTOLARYNGOLOGY

## 2018-06-14 ASSESSMENT — ENCOUNTER SYMPTOMS
CHOKING: 0
FACIAL SWELLING: 0
COUGH: 0
STRIDOR: 0
VOICE CHANGE: 0
NAUSEA: 0
VOMITING: 0
SORE THROAT: 0
CHEST TIGHTNESS: 0
TROUBLE SWALLOWING: 0
DIARRHEA: 0
APNEA: 0
ABDOMINAL PAIN: 0
SHORTNESS OF BREATH: 0
RHINORRHEA: 0
SINUS PRESSURE: 0
WHEEZING: 0
COLOR CHANGE: 0

## 2018-06-17 DIAGNOSIS — J32.1 CHRONIC FRONTAL SINUSITIS: ICD-10-CM

## 2018-06-17 DIAGNOSIS — Z98.890 STATUS POST FUNCTIONAL ENDOSCOPIC SINUS SURGERY: ICD-10-CM

## 2018-06-17 DIAGNOSIS — J32.0 CHRONIC MAXILLARY SINUSITIS: ICD-10-CM

## 2018-06-17 DIAGNOSIS — J32.2 CHRONIC ETHMOIDAL SINUSITIS: Primary | ICD-10-CM

## 2018-06-17 LAB
ORGANISM: ABNORMAL
ORGANISM: ABNORMAL
THROAT/NOSE CULTURE: ABNORMAL

## 2018-06-17 RX ORDER — SULFAMETHOXAZOLE AND TRIMETHOPRIM 800; 160 MG/1; MG/1
1 TABLET ORAL 2 TIMES DAILY
Qty: 56 TABLET | Refills: 2 | Status: SHIPPED | OUTPATIENT
Start: 2018-06-17 | End: 2018-07-15

## 2018-06-27 ENCOUNTER — TELEPHONE (OUTPATIENT)
Dept: ENT CLINIC | Age: 48
End: 2018-06-27

## 2018-06-29 ENCOUNTER — OFFICE VISIT (OUTPATIENT)
Dept: ENT CLINIC | Age: 48
End: 2018-06-29

## 2018-06-29 VITALS
HEIGHT: 67 IN | WEIGHT: 152 LBS | RESPIRATION RATE: 12 BRPM | TEMPERATURE: 98.3 F | HEART RATE: 88 BPM | SYSTOLIC BLOOD PRESSURE: 112 MMHG | DIASTOLIC BLOOD PRESSURE: 64 MMHG | BODY MASS INDEX: 23.86 KG/M2

## 2018-06-29 DIAGNOSIS — J32.2 CHRONIC ETHMOIDAL SINUSITIS: Primary | ICD-10-CM

## 2018-06-29 DIAGNOSIS — Z98.890 STATUS POST FUNCTIONAL ENDOSCOPIC SINUS SURGERY: ICD-10-CM

## 2018-06-29 DIAGNOSIS — J32.0 CHRONIC MAXILLARY SINUSITIS: ICD-10-CM

## 2018-06-29 PROCEDURE — 99024 POSTOP FOLLOW-UP VISIT: CPT | Performed by: OTOLARYNGOLOGY

## 2018-06-29 ASSESSMENT — ENCOUNTER SYMPTOMS
WHEEZING: 0
FACIAL SWELLING: 0
RHINORRHEA: 0
VOMITING: 0
CHEST TIGHTNESS: 0
SORE THROAT: 0
COUGH: 0
STRIDOR: 0
TROUBLE SWALLOWING: 0
COLOR CHANGE: 0
SHORTNESS OF BREATH: 0
NAUSEA: 0
APNEA: 0
CHOKING: 0
SINUS PRESSURE: 0
ABDOMINAL PAIN: 0
VOICE CHANGE: 0
DIARRHEA: 0

## 2018-06-29 NOTE — LETTER
[facility]  [address]  [place] [address] [place] [address]  Phone: [phone]  Fax: [phone]    Shivam Carrera MD        July 29, 2018    [facility], APRN - [facility]    Patient: Dominic Henry   MR Number: [id number]   YOB: 1970   Date of Visit: 6/29/2018     Dear [facility],    I recently saw your patient, Dominic Henry, regarding Her nose and sinus surgery. She is doing very well at this point. Culture from her last visit showed staph aureus and Enterobacter cloacae. Below are the relevant portions of my assessment and plan of care. Assessment & Plan   Diagnoses and all orders for this visit:     Diagnosis Orders   1. Chronic ethmoidal sinusitis     2. Chronic maxillary sinusitis     3. Status post functional endoscopic sinus surgery         The findings were explained and her questions were answered. Suggested she continue the antibiotics for now, along with buffered saline irrigations. She is taking the pomegranate. She actually feels fine and has an excellent prognosis      Return in about 1 month (around 7/29/2018) for Post-Op Check. If you have questions, please do not hesitate to call me. I look forward to following Vanessa along with you.     Sincerely,          Shivam Carrera MD

## 2018-06-29 NOTE — PROGRESS NOTES
Social History   Substance Use Topics    Smoking status: Never Smoker    Smokeless tobacco: Never Used    Alcohol use No       Subjective:      Review of Systems   Constitutional: Negative for activity change, appetite change, chills, diaphoresis, fatigue, fever and unexpected weight change. HENT: Negative for congestion, dental problem, ear discharge, ear pain, facial swelling, hearing loss, mouth sores, nosebleeds, postnasal drip, rhinorrhea, sinus pressure, sneezing, sore throat, tinnitus, trouble swallowing and voice change. Eyes: Negative for visual disturbance. Respiratory: Negative for apnea, cough, choking, chest tightness, shortness of breath, wheezing and stridor. Cardiovascular: Negative for chest pain, palpitations and leg swelling. Gastrointestinal: Negative for abdominal pain, diarrhea, nausea and vomiting. Endocrine: Negative for cold intolerance, heat intolerance, polydipsia and polyuria. Genitourinary: Negative for dysuria, enuresis and hematuria. Musculoskeletal: Negative for arthralgias, gait problem, neck pain and neck stiffness. Skin: Negative for color change and rash. Allergic/Immunologic: Negative for environmental allergies, food allergies and immunocompromised state. Neurological: Negative for dizziness, syncope, facial asymmetry, speech difficulty, light-headedness and headaches. Hematological: Negative for adenopathy. Does not bruise/bleed easily. Psychiatric/Behavioral: Negative for confusion and sleep disturbance. The patient is not nervous/anxious. Objective:   /64 (Site: Left Arm, Position: Sitting)   Pulse 88   Temp 98.3 °F (36.8 °C) (Oral)   Resp 12   Ht 5' 7\" (1.702 m)   Wt 152 lb (68.9 kg)   BMI 23.81 kg/m²     Physical Exam   Nose: Nose decongested and anesthetized examined with 30° telescope.   The low-grade infection which grew out staph aureus and Enterobacter like, appears much improved    Data:  All of the past medical history, past surgical history, family history, social history, allergies and current medications were reviewed with the patient. Assessment & Plan   Diagnoses and all orders for this visit:     Diagnosis Orders   1. Chronic ethmoidal sinusitis     2. Chronic maxillary sinusitis     3. Status post functional endoscopic sinus surgery         The findings were explained and her questions were answered. Suggested she continue the antibiotics for now, along with buffered saline irrigations. She is taking the pomegranate. She actually feels fine and has an excellent prognosis      Return in about 1 month (around 7/29/2018) for Post-Op Check. Kayleigh Ruelas. Janell Moreau MD    **This report has been created using voice recognition software. It may contain minor errors which are inherent in voice recognition technology. **

## 2018-07-23 ENCOUNTER — TELEPHONE (OUTPATIENT)
Dept: ENT CLINIC | Age: 48
End: 2018-07-23

## 2018-07-23 NOTE — TELEPHONE ENCOUNTER
Patient cannot make appointment this Thursday due to work and has to be there at 15 all week, notified patient with her schedule and Dr Bety Colon schedule we will talk to him and find out if he needs to see patient, she states she is feeling better, and f he does have to see her where can we work her in at, how long can she wait before being seen, also if he does not need to see her back can she stop the antibiotics. Please advise.

## 2018-08-03 ENCOUNTER — OFFICE VISIT (OUTPATIENT)
Dept: ENT CLINIC | Age: 48
End: 2018-08-03

## 2018-08-03 VITALS
HEIGHT: 67 IN | BODY MASS INDEX: 23.87 KG/M2 | HEART RATE: 60 BPM | DIASTOLIC BLOOD PRESSURE: 70 MMHG | TEMPERATURE: 98.5 F | SYSTOLIC BLOOD PRESSURE: 110 MMHG | RESPIRATION RATE: 12 BRPM | WEIGHT: 152.1 LBS

## 2018-08-03 DIAGNOSIS — R06.83 SNORING: ICD-10-CM

## 2018-08-03 DIAGNOSIS — J32.0 CHRONIC MAXILLARY SINUSITIS: ICD-10-CM

## 2018-08-03 DIAGNOSIS — Z98.890 STATUS POST FUNCTIONAL ENDOSCOPIC SINUS SURGERY: Primary | ICD-10-CM

## 2018-08-03 DIAGNOSIS — R09.81 NASAL CONGESTION: ICD-10-CM

## 2018-08-03 DIAGNOSIS — J32.1 CHRONIC FRONTAL SINUSITIS: ICD-10-CM

## 2018-08-03 DIAGNOSIS — J34.3 HYPERTROPHY OF INFERIOR NASAL TURBINATE: ICD-10-CM

## 2018-08-03 DIAGNOSIS — J34.3 HYPERTROPHY, NASAL, TURBINATE: ICD-10-CM

## 2018-08-03 DIAGNOSIS — J32.2 CHRONIC ETHMOIDAL SINUSITIS: ICD-10-CM

## 2018-08-03 DIAGNOSIS — J34.2 DEVIATED NASAL SEPTUM: ICD-10-CM

## 2018-08-03 PROCEDURE — 99024 POSTOP FOLLOW-UP VISIT: CPT | Performed by: OTOLARYNGOLOGY

## 2018-08-03 ASSESSMENT — ENCOUNTER SYMPTOMS
WHEEZING: 0
RHINORRHEA: 0
VOMITING: 0
APNEA: 0
TROUBLE SWALLOWING: 0
COLOR CHANGE: 0
VOICE CHANGE: 0
ABDOMINAL PAIN: 0
CHEST TIGHTNESS: 0
FACIAL SWELLING: 0
SHORTNESS OF BREATH: 0
CHOKING: 0
COUGH: 0
SORE THROAT: 0
NAUSEA: 0
STRIDOR: 0
DIARRHEA: 0
SINUS PRESSURE: 0

## 2018-08-03 NOTE — PROGRESS NOTES
 Bipolar Disorder Mother     Hypertension Mother    Mindy Smallwood Schizophrenia Mother     Mental Illness Mother     High Blood Pressure Mother     High Cholesterol Mother     Heart Disease Father     Asthma Neg Hx     Birth Defects Neg Hx     Depression Neg Hx     Diabetes Neg Hx     Early Death Neg Hx     Hearing Loss Neg Hx      Social History   Substance Use Topics    Smoking status: Never Smoker    Smokeless tobacco: Never Used    Alcohol use No       Subjective:      Review of Systems   Constitutional: Negative for activity change, appetite change, chills, diaphoresis, fatigue, fever and unexpected weight change. HENT: Negative for congestion, dental problem, ear discharge, ear pain, facial swelling, hearing loss, mouth sores, nosebleeds, postnasal drip, rhinorrhea, sinus pressure, sneezing, sore throat, tinnitus, trouble swallowing and voice change. Eyes: Negative for visual disturbance. Respiratory: Negative for apnea, cough, choking, chest tightness, shortness of breath, wheezing and stridor. Cardiovascular: Negative for chest pain, palpitations and leg swelling. Gastrointestinal: Negative for abdominal pain, diarrhea, nausea and vomiting. Endocrine: Negative for cold intolerance, heat intolerance, polydipsia and polyuria. Genitourinary: Negative for dysuria, enuresis and hematuria. Musculoskeletal: Negative for arthralgias, gait problem, neck pain and neck stiffness. Skin: Negative for color change and rash. Allergic/Immunologic: Negative for environmental allergies, food allergies and immunocompromised state. Neurological: Negative for dizziness, syncope, speech difficulty, light-headedness and headaches. Hematological: Negative for adenopathy. Does not bruise/bleed easily. Psychiatric/Behavioral: Negative for confusion and sleep disturbance. The patient is not nervous/anxious.         Objective:     /70 (Site: Left Arm, Position: Sitting)   Pulse 60   Temp 98.5 °F MD TAZ on 8/3/2018 at 1:42 PM

## 2018-10-18 ENCOUNTER — HOSPITAL ENCOUNTER (OUTPATIENT)
Dept: WOMENS IMAGING | Age: 48
Discharge: HOME | End: 2018-10-18
Payer: COMMERCIAL

## 2018-10-18 DIAGNOSIS — Z12.31 VISIT FOR SCREENING MAMMOGRAM: ICD-10-CM

## 2018-10-18 PROCEDURE — 77063 BREAST TOMOSYNTHESIS BI: CPT

## 2019-10-07 ENCOUNTER — HOSPITAL ENCOUNTER (EMERGENCY)
Age: 49
Discharge: HOME | End: 2019-10-07
Payer: COMMERCIAL

## 2019-10-07 VITALS
HEIGHT: 67 IN | BODY MASS INDEX: 23.54 KG/M2 | SYSTOLIC BLOOD PRESSURE: 137 MMHG | RESPIRATION RATE: 16 BRPM | WEIGHT: 150 LBS | HEART RATE: 87 BPM | OXYGEN SATURATION: 96 % | TEMPERATURE: 98 F | DIASTOLIC BLOOD PRESSURE: 99 MMHG

## 2019-10-07 DIAGNOSIS — J06.9 UPPER RESPIRATORY TRACT INFECTION, UNSPECIFIED TYPE: ICD-10-CM

## 2019-10-07 DIAGNOSIS — J40 BRONCHITIS: ICD-10-CM

## 2019-10-07 DIAGNOSIS — J01.00 ACUTE NON-RECURRENT MAXILLARY SINUSITIS: ICD-10-CM

## 2019-10-07 DIAGNOSIS — R05.9 COUGH: Primary | ICD-10-CM

## 2019-10-07 PROCEDURE — 99214 OFFICE O/P EST MOD 30 MIN: CPT

## 2019-10-07 RX ORDER — DEXTROMETHORPHAN HYDROBROMIDE AND PROMETHAZINE HYDROCHLORIDE 15; 6.25 MG/5ML; MG/5ML
2.5 SYRUP ORAL 2 TIMES DAILY PRN
Qty: 118 ML | Refills: 0 | Status: SHIPPED | OUTPATIENT
Start: 2019-10-07 | End: 2019-10-14

## 2019-10-07 RX ORDER — M-VIT,TX,IRON,MINS/CALC/FOLIC 27MG-0.4MG
1 TABLET ORAL DAILY
COMMUNITY
End: 2022-06-21

## 2019-10-07 RX ORDER — AMOXICILLIN AND CLAVULANATE POTASSIUM 875; 125 MG/1; MG/1
1 TABLET, FILM COATED ORAL 2 TIMES DAILY
Qty: 20 TABLET | Refills: 0 | Status: SHIPPED | OUTPATIENT
Start: 2019-10-07 | End: 2019-10-17

## 2019-10-07 ASSESSMENT — ENCOUNTER SYMPTOMS
SORE THROAT: 1
NAUSEA: 0
SWOLLEN GLANDS: 1
STRIDOR: 0
CHEST TIGHTNESS: 0
VOMITING: 0
HOARSE VOICE: 0
EYE DISCHARGE: 0
SINUS PRESSURE: 1
EYE PAIN: 0
SNORING: 0
SHORTNESS OF BREATH: 0
EYE REDNESS: 0
EYE ITCHING: 0
WHEEZING: 0
COUGH: 1
RHINORRHEA: 1
COLOR CHANGE: 0

## 2019-10-07 ASSESSMENT — PAIN DESCRIPTION - DESCRIPTORS: DESCRIPTORS: ACHING

## 2019-10-07 ASSESSMENT — PAIN DESCRIPTION - PAIN TYPE: TYPE: ACUTE PAIN

## 2019-10-07 ASSESSMENT — PAIN DESCRIPTION - PROGRESSION: CLINICAL_PROGRESSION: NOT CHANGED

## 2019-10-07 ASSESSMENT — PAIN DESCRIPTION - FREQUENCY: FREQUENCY: INTERMITTENT

## 2019-10-07 ASSESSMENT — PAIN - FUNCTIONAL ASSESSMENT: PAIN_FUNCTIONAL_ASSESSMENT: PREVENTS OR INTERFERES SOME ACTIVE ACTIVITIES AND ADLS

## 2019-10-07 ASSESSMENT — PAIN DESCRIPTION - ORIENTATION: ORIENTATION: RIGHT;LEFT

## 2019-10-07 ASSESSMENT — PAIN SCALES - GENERAL: PAINLEVEL_OUTOF10: 3

## 2019-10-07 ASSESSMENT — PAIN DESCRIPTION - LOCATION: LOCATION: EAR;HEAD

## 2019-10-07 ASSESSMENT — PAIN DESCRIPTION - ONSET: ONSET: ON-GOING

## 2019-10-25 ENCOUNTER — HOSPITAL ENCOUNTER (OUTPATIENT)
Dept: WOMENS IMAGING | Age: 49
Discharge: HOME | End: 2019-10-25
Payer: COMMERCIAL

## 2019-10-25 DIAGNOSIS — Z12.31 VISIT FOR SCREENING MAMMOGRAM: ICD-10-CM

## 2019-10-25 PROCEDURE — 77063 BREAST TOMOSYNTHESIS BI: CPT

## 2020-07-13 ENCOUNTER — EMPLOYEE WELLNESS (OUTPATIENT)
Dept: OTHER | Age: 50
End: 2020-07-13

## 2020-07-13 LAB
CHOLESTEROL, TOTAL: 200 MG/DL (ref 0–199)
FASTING: YES
GLUCOSE BLD-MCNC: 112 MG/DL (ref 74–109)
HDLC SERPL-MCNC: 63 MG/DL (ref 40–90)
LDL CHOLESTEROL CALCULATED: 101 MG/DL
TRIGL SERPL-MCNC: 181 MG/DL (ref 0–199)

## 2020-10-19 VITALS — WEIGHT: 147 LBS | BODY MASS INDEX: 23.02 KG/M2

## 2021-01-14 ENCOUNTER — HOSPITAL ENCOUNTER (OUTPATIENT)
Age: 51
Discharge: HOME | End: 2021-01-14
Payer: COMMERCIAL

## 2021-01-14 ENCOUNTER — HOSPITAL ENCOUNTER (OUTPATIENT)
Dept: GENERAL RADIOLOGY | Age: 51
Discharge: HOME | End: 2021-01-14
Payer: COMMERCIAL

## 2021-01-14 ENCOUNTER — OFFICE VISIT (OUTPATIENT)
Dept: FAMILY MEDICINE CLINIC | Age: 51
End: 2021-01-14
Payer: COMMERCIAL

## 2021-01-14 VITALS
SYSTOLIC BLOOD PRESSURE: 124 MMHG | OXYGEN SATURATION: 96 % | TEMPERATURE: 97 F | WEIGHT: 151.2 LBS | BODY MASS INDEX: 23.73 KG/M2 | HEIGHT: 67 IN | RESPIRATION RATE: 14 BRPM | DIASTOLIC BLOOD PRESSURE: 80 MMHG | HEART RATE: 77 BPM

## 2021-01-14 DIAGNOSIS — M89.8X9 BONY PROMINENCE: ICD-10-CM

## 2021-01-14 DIAGNOSIS — Z00.00 ENCOUNTER FOR MEDICAL EXAMINATION TO ESTABLISH CARE: Primary | ICD-10-CM

## 2021-01-14 DIAGNOSIS — J34.3 NASAL TURBINATE HYPERTROPHY: ICD-10-CM

## 2021-01-14 DIAGNOSIS — R09.81 CHRONIC NASAL CONGESTION: ICD-10-CM

## 2021-01-14 DIAGNOSIS — R06.83 SNORES: ICD-10-CM

## 2021-01-14 PROBLEM — J32.2 CHRONIC ETHMOIDAL SINUSITIS: Status: RESOLVED | Noted: 2018-04-12 | Resolved: 2021-01-14

## 2021-01-14 PROBLEM — J32.0 CHRONIC MAXILLARY SINUSITIS: Status: RESOLVED | Noted: 2018-04-12 | Resolved: 2021-01-14

## 2021-01-14 PROBLEM — J34.2 DEVIATED NASAL SEPTUM: Status: RESOLVED | Noted: 2018-04-12 | Resolved: 2021-01-14

## 2021-01-14 PROCEDURE — 99204 OFFICE O/P NEW MOD 45 MIN: CPT | Performed by: NURSE PRACTITIONER

## 2021-01-14 PROCEDURE — 70260 X-RAY EXAM OF SKULL: CPT

## 2021-01-14 PROCEDURE — 90471 IMMUNIZATION ADMIN: CPT | Performed by: NURSE PRACTITIONER

## 2021-01-14 PROCEDURE — 90715 TDAP VACCINE 7 YRS/> IM: CPT | Performed by: NURSE PRACTITIONER

## 2021-01-14 RX ORDER — FLUTICASONE PROPIONATE 50 MCG
1 SPRAY, SUSPENSION (ML) NASAL DAILY
Qty: 2 BOTTLE | Refills: 1 | Status: SHIPPED | OUTPATIENT
Start: 2021-01-14 | End: 2022-03-29 | Stop reason: ALTCHOICE

## 2021-01-14 RX ORDER — PSEUDOEPHEDRINE HYDROCHLORIDE 30 MG/1
30 TABLET ORAL EVERY 4 HOURS PRN
COMMUNITY

## 2021-01-14 ASSESSMENT — PATIENT HEALTH QUESTIONNAIRE - PHQ9
SUM OF ALL RESPONSES TO PHQ QUESTIONS 1-9: 0
SUM OF ALL RESPONSES TO PHQ QUESTIONS 1-9: 0
SUM OF ALL RESPONSES TO PHQ9 QUESTIONS 1 & 2: 0
SUM OF ALL RESPONSES TO PHQ QUESTIONS 1-9: 0
1. LITTLE INTEREST OR PLEASURE IN DOING THINGS: 0

## 2021-01-14 ASSESSMENT — ENCOUNTER SYMPTOMS
ABDOMINAL PAIN: 0
SORE THROAT: 0
SINUS PAIN: 0
RESPIRATORY NEGATIVE: 1
ABDOMINAL DISTENTION: 0
SINUS PRESSURE: 0
RHINORRHEA: 1
BACK PAIN: 0

## 2021-01-14 NOTE — PROGRESS NOTES
[facility]  [address] [place] [address]  Dept: [phone]  Dept Fax: [phone]  [facility] Leonora Cabrera is a 52 y.o. femalewho presents today for her medical conditions/complaints as noted below. Vanessa Singh c/o of New Patient (previous PCP Andrea Perez ), Sinus Problem (always stuffy and always has sinus h/a, taking sudafed QD, otc allergy med dont fully help), Mass (back of head/neck on left side, not painful, noticed it before covid), and Health Maintenance (will do mammo if ordered, had total hysterectomy 10 years ago so no pap)      HPI:      Pt here to establish care. Pt had sinus surgery 2 years ago and her nasal congestion did improve short term but now she continues to have chronic nasal congestion worse during the day and postnasal drainage. Has been using afrin infrequently but sudafed everyday without improvement. Will have a sinus headache on occasion, not everyday. Denies vision changes or dizziness, states she feels a bony prominence at left side of posterior skull, it is firm will hurt if palpated. 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 Has used flonase in the past but it did not help, does snore at night. We did discuss sleep apnea and testing she will think about it. Is tired when she wakes up, sometimes tired during the day.           Current Outpatient Medications   Medication Sig Dispense Refill    pseudoephedrine (SUDAFED) 30 MG tablet Take 30 mg by mouth every 4 hours as needed for Congestion      fluticasone (FLONASE) 50 MCG/ACT nasal spray 1 spray by Each Nostril route daily 2 Bottle 1    Multiple Vitamins-Minerals (THERAPEUTIC MULTIVITAMIN-MINERALS) tablet Take 1 tablet by mouth daily       No current facility-administered medications for this visit. Past Medical History:   Diagnosis Date    Headache       Past Surgical History:   Procedure Laterality Date    BRAIN SURGERY      tumor removed      SECTION      x1    HYSTERECTOMY      OTHER SURGICAL HISTORY      growth removed under arm    OR REPAIR OF NASAL SEPTUM Bilateral 2018    IGS ENDOSCOPIC POSSIBLE FRONTAL BILATERAL, BILATERAL MAXILLARY ANTROSTOMY, BILATERAL ETHMOIDECTOMIES, SEPTOPLASTY, SUBMUCOUS RESECT TTURBINATES (SMR) PARTIAL performed by Miriam Rodney MD at [facility]     Family History   Problem Relation Age of Onset    Bipolar Disorder Mother     Hypertension Mother    Lorena Josefina Schizophrenia Mother     Mental Illness Mother     High Blood Pressure Mother     High Cholesterol Mother     Heart Disease Father     Asthma Neg Hx     Birth Defects Neg Hx     Depression Neg Hx     Diabetes Neg Hx     Early Death Neg Hx     Hearing Loss Neg Hx      Social History     Tobacco Use    Smoking status: Never Smoker    Smokeless tobacco: Never Used   Substance Use Topics    Alcohol use: No        Allergies   Allergen Reactions    Seasonal        Health Maintenance   Topic Date Due    Cervical cancer screen  1993    Lipid screen  2025    DTaP/Tdap/Td vaccine (2 - Td) 2031    Flu vaccine  Completed    Hepatitis A vaccine  Aged Out    Hepatitis B vaccine  Aged Out    Hib vaccine  Aged Out    Meningococcal (ACWY) vaccine  Aged Out    Pneumococcal 0-64 years Vaccine  Aged Out    Hepatitis C screen  Discontinued    HIV screen  Discontinued       Subjective:      Review of Systems   Constitutional: Negative for chills, fatigue and fever. HENT: Positive for congestion (nasal), postnasal drip and rhinorrhea. Negative for sinus pressure, sinus pain, sneezing, sore throat and tinnitus. Respiratory: Negative. Cardiovascular: Negative.     Gastrointestinal: Negative for abdominal distention and abdominal pain.   Genitourinary: Negative for difficulty urinating and dysuria. Musculoskeletal: Negative for arthralgias, back pain and myalgias. Skin: Negative. Neurological: Negative for dizziness, tremors, syncope, facial asymmetry and headaches. Psychiatric/Behavioral: Negative for self-injury, sleep disturbance and suicidal ideas. Objective:      /80   Pulse 77   Temp 97 °F (36.1 °C) (Temporal)   Resp 14   Ht 5' 7\" (1.702 m)   Wt 151 lb 3.2 oz (68.6 kg)   SpO2 96%   BMI 23.68 kg/m²      Physical Exam  Vitals signs and nursing note reviewed. Constitutional:       Appearance: She is not ill-appearing. HENT:      Head:        Comments: Left posterior firm bony prominence palpated, not fluid filled not soft, not red warm or tender. The area is quarter size     Right Ear: Hearing, tympanic membrane, ear canal and external ear normal.      Left Ear: Hearing, tympanic membrane, ear canal and external ear normal.      Nose: Mucosal edema, congestion and rhinorrhea present. No septal deviation. Right Nostril: No foreign body or occlusion. Left Nostril: Occlusion present. No foreign body or septal hematoma. Right Turbinates: Not enlarged, swollen or pale. Left Turbinates: Enlarged and swollen. Not pale. Cardiovascular:      Rate and Rhythm: Normal rate and regular rhythm. Pulses: Normal pulses. Heart sounds: Normal heart sounds. No murmur. Pulmonary:      Effort: Pulmonary effort is normal. No respiratory distress. Breath sounds: Normal breath sounds. No wheezing. Abdominal:      General: Abdomen is flat. Bowel sounds are normal. There is no distension. Palpations: Abdomen is soft. Tenderness: There is no abdominal tenderness. Skin:     General: Skin is warm and dry. Capillary Refill: Capillary refill takes less than 2 seconds. Neurological:      General: No focal deficit present. Mental Status: She is alert.           Assessment/Plan: 1. Encounter for medical examination to establish care      2. Chronic nasal congestion  Clariting, sudafed, altamist    3. Bony prominence  Normal skull presentation vs bony calcification   - XR SKULL (MIN 4 VIEWS); Future    4. Nasal turbinate hypertrophy  [place] nasal spray altamist  If no better will change to flonase or nasonex  livia pot irrigation  Question nasal polyp growth     5. Snores  Declines sleep study, will think about it  I did discuss the benefits of having it don and the risk of not completing it. Can try  Nasal breathing strips at night   Pt in agreement with plan     Return in about 6 weeks (around 2/25/2021) for f/u new med. Reccommended tobaccocessation options including pharmacologic methods, counseled great than 3 minutesduring this visit:  Yes[]  No  []       Patient given educational materials -see patient instructions. Discussed use, benefit, and side effects of prescribedmedications. All patient questions answered. Pt voiced understanding. Reviewedhealth maintenance. Instructed to continue current medications, diet and exercise. Patient agreed with treatment plan. Follow up as directed.        Electronicallysigned by BLAIR Thomas CNP on 1/14/2021 at 11:11 AM

## 2021-01-14 NOTE — PATIENT INSTRUCTIONS
Patient Education        Enlarged Turbinates: Care Instructions  Your Care Instructions  The turbinates are thin, bony plates inside your nose. Allergies or a lengthy cold can irritate them and cause them to swell, or enlarge. The swelling makes it hard for you to breathe. Another cause of the swelling is overuse of decongestant nasal sprays. Sometimes it is not clear why turbinates swell. In most cases, the swelling does not cause pain. But it can feel like an object is blocking one side of your nose. Your doctor may do tests to be sure about what is causing the problem. He or she may prescribe a steroid spray to reduce swelling. Your doctor may advise you to stop using over-the-counter nasal sprays. If the turbinate swelling does not go down, you may need surgery to open your nasal passage. Follow-up care is a key part of your treatment and safety. Be sure to make and go to all appointments, and call your doctor if you are having problems. It's also a good idea to know your test results and keep a list of the medicines you take. How can you care for yourself at home? · Take your medicines or use nasal sprays exactly as prescribed. Call your doctor if you have any problems with your medicine. You will get more details on the specific medicines your doctor prescribes. · Ask your doctor about cough medicines and decongestants, including nasal sprays. Some doctors recommend these medicines, while others feel that they do not help. Treat a symptom with a medicine that is only for that problem. For example, if you have a cough, use a cough medicine. Don't use a medicine for a cough, stuffy nose, and headache. · Use a vaporizer or humidifier to add moisture to your bedroom. Follow the directions for cleaning the machine. · Use saline (saltwater) nasal washes to help keep your nasal passages open. They wash out mucus and bacteria. You can buy saline nose drops at a grocery store or pharmacy.  Or you can make your own at home. Add 1 teaspoon of salt and 1 teaspoon of baking soda to 2 cups of distilled water. If you make your own, fill a bulb syringe with the solution. Put the tip into your nostril, and squeeze gently. Nadbeto Rocher your nose. · Do not smoke. Smoking can make breathing problems worse. If you need help quitting, talk to your doctor about stop-smoking programs and medicines. These can increase your chances of quitting for good. When should you call for help? Call your doctor now or seek immediate medical care if:    · You have trouble breathing. Watch closely for changes in your health, and be sure to contact your doctor if:    · You do not get better as expected. Where can you learn more? Go to https://Tinkercad.AUM Cardiovascular. org and sign in to your RegenaStem account. Enter S279 in the Walkmore box to learn more about \"Enlarged Turbinates: Care Instructions. \"     If you do not have an account, please click on the \"Sign Up Now\" link. Current as of: April 15, 2020               Content Version: 12.6  © 4916-5348 FitWithMe. Care instructions adapted under license by [facility] ([facility]). If you have questions about a medical condition or this instruction, always ask your healthcare professional. [address] any warranty or liability for your use of this information. Patient Education        Snoring: Care Instructions  Your Care Instructions  Snoring is a noise that you may make while breathing during sleep. You snore when the flow of air from your mouth or nose to your lungs makes the tissues of your throat vibrate while you sleep. This usually is caused by a blockage or narrowing in your nose, mouth, or throat (airway). Snoring can be soft, loud, raspy, harsh, hoarse, or fluttering. Your bed partner may notice that you sleep with your mouth open and that you are restless while sleeping.  If snoring interferes with your or your bed partner's sleep, either or both of you may feel tired during the day. You may be able to help reduce your snoring by making changes in your activities and in the way you sleep. Follow-up care is a key part of your treatment and safety. Be sure to make and go to all appointments, and call your doctor if you are having problems. It's also a good idea to know your test results and keep a list of the medicines you take. How can you care for yourself at home? · Lose weight, if needed. Many people who snore are overweight. Weight loss can help reduce the narrowing of the airway and might reduce or stop snoring. · Limit the use of alcohol and medicines. Drinking a lot of alcohol or taking certain medicines, especially sleeping pills or tranquilizers, before sleep may make snoring worse. · Go to bed at the same time each night, and get plenty of sleep. You may snore more when you have not had enough sleep. · Sleep on your side. Sleeping on your side may stop snoring. Try sewing a pocket in the middle of the back of your pajama top, putting a tennis ball into the pocket, and stitching it closed. This will help keep you from sleeping on your back. · Treat breathing problems. Breathing problems caused by colds or allergies can disturb airflow. This can lead to snoring. · Use a device that helps keep your airway open during sleep. This could be a device that you put in your mouth. Other examples include strips or disks that you use on your nose. · Do not smoke. Smoking can make snoring worse. If you need help quitting, talk to your doctor about stop-smoking programs and medicines. These can increase your chances of quitting for good. · Raise the head of your bed 4 to 6 inches by putting bricks under the legs of the bed. This may prevent your tongue from falling toward the back of the throat, which can make a blocked or narrow airway worse. Putting pillows under your head will not help. When should you call for help?   Watch closely for changes in your health, and be sure to contact your doctor if:    · You snore, and you feel sleepy during the day.     · Your sleeping partner or you notice that you gasp, choke, or stop breathing during sleep.     · You do not get better as expected. Where can you learn more? Go to https://chpepicewel.healthBuy With Fetchpartners. org and sign in to your Helium account. Enter T268 in the Myrio box to learn more about \"Snoring: Care Instructions. \"     If you do not have an account, please click on the \"Sign Up Now\" link. Current as of: February 24, 2020               Content Version: 12.6  © 2006-2020 ElementsLocal. Care instructions adapted under license by [facility] ([facility]). If you have questions about a medical condition or this instruction, always ask your healthcare professional. [place] [address] any warranty or liability for your use of this information. Patient Education        Snoring: Care Instructions  Your Care Instructions  Snoring is a noise that you may make while breathing during sleep. You snore when the flow of air from your mouth or nose to your lungs makes the tissues of your throat vibrate while you sleep. This usually is caused by a blockage or narrowing in your nose, mouth, or throat (airway). Snoring can be soft, loud, raspy, harsh, hoarse, or fluttering. Your bed partner may notice that you sleep with your mouth open and that you are restless while sleeping. If snoring interferes with your or your bed partner's sleep, either or both of you may feel tired during the day. You may be able to help reduce your snoring by making changes in your activities and in the way you sleep. Follow-up care is a key part of your treatment and safety. Be sure to make and go to all appointments, and call your doctor if you are having problems. It's also a good idea to know your test results and keep a list of the medicines you take.   How can you care for yourself at home? · Lose weight, if needed. Many people who snore are overweight. Weight loss can help reduce the narrowing of the airway and might reduce or stop snoring. · Limit the use of alcohol and medicines. Drinking a lot of alcohol or taking certain medicines, especially sleeping pills or tranquilizers, before sleep may make snoring worse. · Go to bed at the same time each night, and get plenty of sleep. You may snore more when you have not had enough sleep. · Sleep on your side. Sleeping on your side may stop snoring. Try sewing a pocket in the middle of the back of your pajama top, putting a tennis ball into the pocket, and stitching it closed. This will help keep you from sleeping on your back. · Treat breathing problems. Breathing problems caused by colds or allergies can disturb airflow. This can lead to snoring. · Use a device that helps keep your airway open during sleep. This could be a device that you put in your mouth. Other examples include strips or disks that you use on your nose. · Do not smoke. Smoking can make snoring worse. If you need help quitting, talk to your doctor about stop-smoking programs and medicines. These can increase your chances of quitting for good. · Raise the head of your bed 4 to 6 inches by putting bricks under the legs of the bed. This may prevent your tongue from falling toward the back of the throat, which can make a blocked or narrow airway worse. Putting pillows under your head will not help. When should you call for help? Watch closely for changes in your health, and be sure to contact your doctor if:    · You snore, and you feel sleepy during the day.     · Your sleeping partner or you notice that you gasp, choke, or stop breathing during sleep.     · You do not get better as expected. Where can you learn more? Go to https://chpeludwigeb.health-partners. org and sign in to your Blackfoot account.  Enter Q591 in the Cruise Compare box to learn more about \"Snoring: Care Instructions. \"     If you do not have an account, please click on the \"Sign Up Now\" link. Current as of: February 24, 2020               Content Version: 12.6  © 2006-2020 Quotify Technology, Incorporated. Care instructions adapted under license by [facility] ([facility]). If you have questions about a medical condition or this instruction, always ask your healthcare professional. [address] any warranty or liability for your use of this information.

## 2021-01-15 ENCOUNTER — TELEPHONE (OUTPATIENT)
Dept: FAMILY MEDICINE CLINIC | Age: 51
End: 2021-01-15

## 2021-01-15 NOTE — TELEPHONE ENCOUNTER
----- Message from BLAIR Welsh CNP sent at 1/14/2021  5:56 PM EST -----  Please let Niecy Chiu know her skull x-ray identifies a calcified region on the right side of head which is most likely related to the shunt tubing. It also identified the bump of concern on the left side and stated it is just an enlarged protuberance of the skull, nothing abnormal. Let me know if she has any questions.

## 2021-03-29 ENCOUNTER — IMMUNIZATION (OUTPATIENT)
Dept: PRIMARY CARE CLINIC | Age: 51
End: 2021-03-29
Payer: COMMERCIAL

## 2021-03-29 PROCEDURE — 0001A COVID-19, PFIZER VACCINE 30MCG/0.3ML DOSE: CPT | Performed by: FAMILY MEDICINE

## 2021-03-29 PROCEDURE — 91300 COVID-19, PFIZER VACCINE 30MCG/0.3ML DOSE: CPT | Performed by: FAMILY MEDICINE

## 2021-04-19 ENCOUNTER — IMMUNIZATION (OUTPATIENT)
Dept: PRIMARY CARE CLINIC | Age: 51
End: 2021-04-19
Payer: COMMERCIAL

## 2021-04-19 PROCEDURE — 91300 COVID-19, PFIZER VACCINE 30MCG/0.3ML DOSE: CPT | Performed by: FAMILY MEDICINE

## 2021-04-19 PROCEDURE — 0002A COVID-19, PFIZER VACCINE 30MCG/0.3ML DOSE: CPT | Performed by: FAMILY MEDICINE

## 2021-06-16 LAB
CHOLESTEROL, TOTAL: 205 MG/DL (ref 0–199)
FASTING: YES
GLUCOSE BLD-MCNC: 85 MG/DL (ref 74–109)
HDLC SERPL-MCNC: 57 MG/DL (ref 40–90)
LDL CHOLESTEROL CALCULATED: 120 MG/DL
TRIGL SERPL-MCNC: 138 MG/DL (ref 0–199)

## 2021-07-28 ENCOUNTER — VIRTUAL VISIT (OUTPATIENT)
Dept: FAMILY MEDICINE CLINIC | Age: 51
End: 2021-07-28
Payer: COMMERCIAL

## 2021-07-28 DIAGNOSIS — H65.191 ACUTE EFFUSION OF RIGHT EAR: ICD-10-CM

## 2021-07-28 DIAGNOSIS — R05.9 COUGH: ICD-10-CM

## 2021-07-28 DIAGNOSIS — J01.10 ACUTE NON-RECURRENT FRONTAL SINUSITIS: Primary | ICD-10-CM

## 2021-07-28 PROCEDURE — 99213 OFFICE O/P EST LOW 20 MIN: CPT | Performed by: NURSE PRACTITIONER

## 2021-07-28 RX ORDER — GUAIFENESIN 600 MG/1
600 TABLET, EXTENDED RELEASE ORAL 2 TIMES DAILY
Qty: 30 TABLET | Refills: 0 | Status: SHIPPED | OUTPATIENT
Start: 2021-07-28 | End: 2021-08-12

## 2021-07-28 RX ORDER — AZITHROMYCIN 250 MG/1
250 TABLET, FILM COATED ORAL SEE ADMIN INSTRUCTIONS
Qty: 6 TABLET | Refills: 0 | Status: SHIPPED | OUTPATIENT
Start: 2021-07-28 | End: 2021-08-02

## 2021-07-28 ASSESSMENT — ENCOUNTER SYMPTOMS
CHEST TIGHTNESS: 0
SHORTNESS OF BREATH: 0
SINUS PAIN: 1
SINUS PRESSURE: 1
WHEEZING: 0
SORE THROAT: 0
RHINORRHEA: 1
CHOKING: 0
COUGH: 1
VOICE CHANGE: 0

## 2021-10-25 ENCOUNTER — HOSPITAL ENCOUNTER (OUTPATIENT)
Dept: WOMENS IMAGING | Age: 51
Discharge: HOME | End: 2021-10-25
Payer: COMMERCIAL

## 2021-10-25 DIAGNOSIS — Z12.31 VISIT FOR SCREENING MAMMOGRAM: ICD-10-CM

## 2021-10-25 PROCEDURE — 77067 SCR MAMMO BI INCL CAD: CPT

## 2022-03-01 ENCOUNTER — OFFICE VISIT (OUTPATIENT)
Dept: FAMILY MEDICINE CLINIC | Age: 52
End: 2022-03-01
Payer: COMMERCIAL

## 2022-03-01 VITALS
HEART RATE: 80 BPM | DIASTOLIC BLOOD PRESSURE: 80 MMHG | BODY MASS INDEX: 24.11 KG/M2 | SYSTOLIC BLOOD PRESSURE: 128 MMHG | OXYGEN SATURATION: 97 % | WEIGHT: 153.6 LBS | TEMPERATURE: 97.5 F | RESPIRATION RATE: 14 BRPM | HEIGHT: 67 IN

## 2022-03-01 DIAGNOSIS — J31.0 CHRONIC RHINITIS: ICD-10-CM

## 2022-03-01 DIAGNOSIS — Z12.11 SCREEN FOR COLON CANCER: ICD-10-CM

## 2022-03-01 DIAGNOSIS — Z83.49 FAMILY HISTORY OF THYROID DISORDER: ICD-10-CM

## 2022-03-01 DIAGNOSIS — Z13.31 DEPRESSION SCREENING NEGATIVE: ICD-10-CM

## 2022-03-01 DIAGNOSIS — R09.81 NASAL CONGESTION: ICD-10-CM

## 2022-03-01 DIAGNOSIS — R06.83 SNORING: Primary | ICD-10-CM

## 2022-03-01 DIAGNOSIS — Z82.49 FAMILY HISTORY OF HYPERTENSION: ICD-10-CM

## 2022-03-01 DIAGNOSIS — R53.83 FATIGUE, UNSPECIFIED TYPE: ICD-10-CM

## 2022-03-01 PROBLEM — J34.3 HYPERTROPHY, NASAL, TURBINATE: Status: RESOLVED | Noted: 2018-04-12 | Resolved: 2022-03-01

## 2022-03-01 PROCEDURE — G0444 DEPRESSION SCREEN ANNUAL: HCPCS | Performed by: NURSE PRACTITIONER

## 2022-03-01 PROCEDURE — 99214 OFFICE O/P EST MOD 30 MIN: CPT | Performed by: NURSE PRACTITIONER

## 2022-03-01 RX ORDER — LEVOCETIRIZINE DIHYDROCHLORIDE 5 MG/1
5 TABLET, FILM COATED ORAL NIGHTLY
Qty: 90 TABLET | Refills: 1 | Status: SHIPPED | OUTPATIENT
Start: 2022-03-01 | End: 2022-09-27 | Stop reason: SDUPTHER

## 2022-03-01 RX ORDER — TRIAMCINOLONE ACETONIDE 55 UG/1
2 SPRAY, METERED NASAL DAILY
Qty: 1 EACH | Refills: 3 | Status: SHIPPED | OUTPATIENT
Start: 2022-03-01 | End: 2022-09-27 | Stop reason: SDUPTHER

## 2022-03-01 ASSESSMENT — ENCOUNTER SYMPTOMS
RHINORRHEA: 1
WHEEZING: 0
SINUS PRESSURE: 1
SORE THROAT: 0
CHEST TIGHTNESS: 0
SINUS PAIN: 0
TROUBLE SWALLOWING: 0
COUGH: 1
SHORTNESS OF BREATH: 0
CHOKING: 0
PHOTOPHOBIA: 0
VOICE CHANGE: 0

## 2022-03-01 ASSESSMENT — PATIENT HEALTH QUESTIONNAIRE - PHQ9
SUM OF ALL RESPONSES TO PHQ QUESTIONS 1-9: 0
1. LITTLE INTEREST OR PLEASURE IN DOING THINGS: 0
SUM OF ALL RESPONSES TO PHQ QUESTIONS 1-9: 0
SUM OF ALL RESPONSES TO PHQ9 QUESTIONS 1 & 2: 0
SUM OF ALL RESPONSES TO PHQ QUESTIONS 1-9: 0
2. FEELING DOWN, DEPRESSED OR HOPELESS: 0
SUM OF ALL RESPONSES TO PHQ QUESTIONS 1-9: 0

## 2022-03-01 NOTE — PROGRESS NOTES
[address] [place] [address]  [address] EILEEN Santiago [address]  Dept: [phone]  Dept Fax: [phone]  Loc: [phone]    Ruth Issa is a 54 y.o. femalewho presents today for her medical conditions/complaints as noted below. Vanessa E Spenceris c/o of Nasal Congestion (been going on for months), Cough (due to drainage), and Headache      HPI:      Pt here to discuss allergies. Allergies    Symptoms:  Clear nasal dranage, post nasal drainage, coughing, ear congestion and sinus pressure. Has had for year did have sinus surgery in 2018 with mild improvement. Current treatment:  Pt has tried and failed flonase, sudafed, claritin and zyrtec  Triggers:  Weather changes  Pets:unknown   Smoker: non-smoker  Other smokers in the home:No    Pt also states her  told her she snores every night, she has tried the nasal strips and she continues to snore. States she does wake up tired ruing the morning, does not have any trouble falling asleep no daytime sleepiness. Needs labs updated family history of HTN and thyroid disorder. Current Outpatient Medications   Medication Sig Dispense Refill    levocetirizine (XYZAL) 5 MG tablet Take 1 tablet by mouth nightly 90 tablet 1    triamcinolone (NASACORT) 55 MCG/ACT nasal inhaler 2 sprays by Each Nostril route daily 1 each 3    pseudoephedrine (SUDAFED) 30 MG tablet Take 30 mg by mouth every 4 hours as needed for Congestion      fluticasone (FLONASE) 50 MCG/ACT nasal spray 1 spray by Each Nostril route daily 2 Bottle 1    Multiple Vitamins-Minerals (THERAPEUTIC MULTIVITAMIN-MINERALS) tablet Take 1 tablet by mouth daily       No current facility-administered medications for this visit.           Past Medical History:   Diagnosis Date    Headache       Past Surgical History:   Procedure Laterality Date    BRAIN SURGERY      tumor removed      SECTION      x1    HYSTERECTOMY  SP US GUID NDL BIOPSY RIGHT Right     2008    OTHER SURGICAL HISTORY      growth removed under arm    [place] REPAIR OF NASAL SEPTUM Bilateral 5/30/2018    IGS ENDOSCOPIC POSSIBLE FRONTAL BILATERAL, BILATERAL MAXILLARY ANTROSTOMY, BILATERAL ETHMOIDECTOMIES, SEPTOPLASTY, SUBMUCOUS RESECT TTURBINATES (SMR) PARTIAL performed by Radha Meza MD at [facility]      brain     Family History   Problem Relation Age of Onset    Bipolar Disorder Mother     Hypertension Mother    Ng Schizophrenia Mother     Mental Illness Mother     High Blood Pressure Mother     High Cholesterol Mother     Heart Disease Father     Asthma Neg Hx     Birth Defects Neg Hx     Depression Neg Hx     Diabetes Neg Hx     Early Death Neg Hx     Hearing Loss Neg Hx      Social History     Tobacco Use    Smoking status: Never Smoker    Smokeless tobacco: Never Used   Substance Use Topics    Alcohol use: No        Allergies   Allergen Reactions    Seasonal        Health Maintenance   Topic Date Due    Depression Screen  Never done    Colorectal Cancer Screen  Never done    Flu vaccine (1) 09/01/2021    Lipid screen  06/16/2026    DTaP/Tdap/Td vaccine (2 - Td or Tdap) 01/14/2031    COVID-19 Vaccine  Completed    Hepatitis A vaccine  Aged Out    Hepatitis B vaccine  Aged Out    Hib vaccine  Aged Out    Meningococcal (ACWY) vaccine  Aged Out    Pneumococcal 0-64 years Vaccine  Aged Out    Hepatitis C screen  Discontinued    HIV screen  Discontinued       Subjective:      Review of Systems   Constitutional: Positive for fatigue. Negative for chills and fever. HENT: Positive for congestion (nasal ), ear pain (ear congestion ), postnasal drip, rhinorrhea and sinus pressure. Negative for sinus pain, sore throat, tinnitus, trouble swallowing and voice change. Eyes: Negative for photophobia and visual disturbance. Respiratory: Positive for cough.  Negative for choking, chest tightness, shortness of breath and wheezing. Cardiovascular: Negative. Skin: Negative. Allergic/Immunologic: Positive for environmental allergies. Psychiatric/Behavioral: Negative for self-injury, sleep disturbance and suicidal ideas. Objective:      /80   Pulse 80   Temp 97.5 °F (36.4 °C) (Oral)   Resp 14   Ht 5' 7\" (1.702 m)   Wt 153 lb 9.6 oz (69.7 kg)   SpO2 97%   BMI 24.06 kg/m²      Physical Exam  Vitals and nursing note reviewed. Constitutional:       Appearance: She is normal weight. She is not ill-appearing. HENT:      Right Ear: Hearing, ear canal and external ear normal. A middle ear effusion is present. There is no impacted cerumen. Left Ear: Hearing, ear canal and external ear normal. A middle ear effusion is present. There is no impacted cerumen. Nose: Congestion and rhinorrhea present. No nasal deformity, septal deviation or nasal tenderness. Right Nostril: No occlusion. Left Nostril: No occlusion. Right Turbinates: Not enlarged, swollen or pale. Left Turbinates: Swollen. Not enlarged or pale. Right Sinus: No maxillary sinus tenderness or frontal sinus tenderness. Left Sinus: No maxillary sinus tenderness or frontal sinus tenderness. Neck:      Thyroid: No thyroid mass, thyromegaly or thyroid tenderness. Cardiovascular:      Rate and Rhythm: Normal rate and regular rhythm. Pulses: Normal pulses. Heart sounds: Normal heart sounds. No murmur heard. Pulmonary:      Effort: Pulmonary effort is normal. No respiratory distress. Breath sounds: Normal breath sounds. No wheezing. Lymphadenopathy:      Cervical: No cervical adenopathy. Skin:     General: Skin is warm and dry. Capillary Refill: Capillary refill takes less than 2 seconds. Neurological:      Mental Status: She is alert. Psychiatric:         Mood and Affect: Mood normal.         Behavior: Behavior normal.         Thought Content:  Thought content normal.         Judgment: Judgment normal.          Assessment/Plan:           1. Snoring  R/o sleep apnea vs sinus disease  - [address] [place] [address]    2. Chronic rhinitis  If no better at next visit will refer to Allergist   - levocetirizine (XYZAL) 5 MG tablet; Take 1 tablet by mouth nightly  Dispense: 90 tablet; Refill: 1  - triamcinolone (NASACORT) 55 MCG/ACT nasal inhaler; 2 sprays by Each Nostril route daily  Dispense: 1 each; Refill: 3    3. Nasal congestion  #2  - levocetirizine (XYZAL) 5 MG tablet; Take 1 tablet by mouth nightly  Dispense: 90 tablet; Refill: 1    4. Depression screening negative      5. Family history of thyroid disorder  R/o thyroid disorder  - TSH With Reflex Ft4; Future    6. Family history of hypertension    - CBC with Auto Differential; Future  - Basic Metabolic Panel; Future    7. Fatigue, unspecified type  R/o anemia vs sleep apnea vs thyroid disorder  - CBC with Auto Differential; Future  - Basic Metabolic Panel; Future  - [address] [place] [address]    8. Screen for colon cancer    - Ambulatory referral to Gastroenterology    Pt in agreement with plan   Return in about 4 weeks (around 3/29/2022) for f/u allergies . Reccommended tobaccocessation options including pharmacologic methods, counseled great than 3 minutesduring this visit:  Yes[]  No  []       Patient given educational materials -see patient instructions. Discussed use, benefit, and side effects of prescribedmedications. All patient questions answered. Pt voiced understanding. Reviewedhealth maintenance. Instructed to continue current medications, diet and exercise. Patient agreed with treatment plan. Follow up as directed.        Electronicallysigned by BLAIR Daniels CNP on 3/1/2022 at 10:59 AM

## 2022-03-01 NOTE — PATIENT INSTRUCTIONS
Patient Education        Colon Cancer Screening: Care Instructions  Overview     Colorectal cancer occurs in the colon or rectum. That's the lower part of your digestive system. It often starts in small growths called polyps in the colon or rectum. Polyps are usually found with screening tests. Depending on the type of test, any polyps found may be removed during the tests. Colorectal cancer usually does not cause symptoms at first. But regular tests can help find it early, before it spreads and becomes harder to treat. Your risk for colorectal cancer gets higher as you get older. Experts recommend starting screening at age 39 for people who are at average risk. Talk with your doctor about your risk and when to start and stop screening. You may have one of several tests. Follow-up care is a key part of your treatment and safety. Be sure to make and go to all appointments, and call your doctor if you are having problems. It's also a good idea to know your test results and keep a list of the medicines you take. What are the main screening tests for colon cancer? The screening tests are:  Stool tests. These include the guaiac fecal occult blood test (gFOBT), the fecal immunochemical test (FIT), and the combined fecal immunochemical test and stool DNA test (FIT-DNA). These tests check stool samples for signs of cancer. If your test is positive, you will need to have a colonoscopy. Sigmoidoscopy. This test lets your doctor look at the lining of your rectum and the lowest part of your colon. Your doctor uses a lighted tube called a sigmoidoscope. This test can't find cancers or polyps in the upper part of your colon. In some cases, polyps that are found can be removed. But if your doctor finds polyps, you will need to have a colonoscopy to check the upper part of your colon. Colonoscopy. This test lets your doctor look at the lining of your rectum and your entire colon.  The doctor uses a thin, flexible tool called a colonoscope. It can also be used to remove polyps or get a tissue sample (biopsy). A less common test is CT colonography (CTC). It's also called virtual colonoscopy. Who should be screened for colorectal cancer? Your risk for colorectal cancer gets higher as you get older. Experts recommend starting screening at age 39 for people who are at average risk. Talk with your doctor about your risk and when to start and stop screening. How often you need screening depends on the type of test you get:  Stool tests. Every year for FIT or gFOBT. Every 1 to 3 years for sDNA, also called FIT-DNA. Tests that look inside the colon. Every 5 years for sigmoidoscopy. (If you do the FIT test every year, you can get this test every 10 years.)  Every 5 years for CT colonography (virtual colonoscopy). Every 10 years for colonoscopy. Experts agree that people at higher risk may need to be tested sooner and more often. This includes people who have a strong family history of colon cancer. Talk to your doctor about which test is best for you and when to be tested. When should you call for help? Watch closely for changes in your health, and be sure to contact your doctor if:    · You have any changes in your bowel habits.     · You have any problems. Where can you learn more? Go to https://MobileX Labs.GEOCOMtms. org and sign in to your "Qnect, llc" account. Enter [id number] in the [facility] box to learn more about \"Colon Cancer Screening: Care Instructions. \"     If you do not have an account, please click on the \"Sign Up Now\" link. Current as of: September 8, 2021               Content Version: 13.1  © 9017-1200 Healthwise, Incorporated. Care instructions adapted under license by [facility] ([facility]). If you have questions about a medical condition or this instruction, always ask your healthcare professional. [address] any warranty or liability for your use of this information. solution, insert the tip into your nostril, and squeeze gently. Salvadore العلي your nose. When should you call for help? Call your doctor now or seek immediate medical care if:    · You are having trouble breathing. Watch closely for changes in your health, and be sure to contact your doctor if:    · Mucus from your nose gets thicker (like pus) or has new blood in it.     · You have new or worse symptoms.     · You do not get better as expected. Where can you learn more? Go to https://SodaHead.Novariant. org and sign in to your Echograph account. Enter M030 in the Asurint box to learn more about \"Rhinitis: Care Instructions. \"     If you do not have an account, please click on the \"Sign Up Now\" link. Current as of: September 8, 2021               Content Version: 13.1  © 1414-3250 Healthwise, Incorporated. Care instructions adapted under license by [facility] ([facility]). If you have questions about a medical condition or this instruction, always ask your healthcare professional. [address] any warranty or liability for your use of this information.

## 2022-03-04 ENCOUNTER — HOSPITAL ENCOUNTER (OUTPATIENT)
Age: 52
Discharge: HOME | End: 2022-03-04
Payer: COMMERCIAL

## 2022-03-04 DIAGNOSIS — Z83.49 FAMILY HISTORY OF THYROID DISORDER: ICD-10-CM

## 2022-03-04 DIAGNOSIS — Z82.49 FAMILY HISTORY OF HYPERTENSION: ICD-10-CM

## 2022-03-04 DIAGNOSIS — R53.83 FATIGUE, UNSPECIFIED TYPE: ICD-10-CM

## 2022-03-04 LAB
ANION GAP SERPL CALCULATED.3IONS-SCNC: 10 MEQ/L (ref 8–16)
BASOPHILS # BLD: 0.4 %
BASOPHILS ABSOLUTE: 0 THOU/MM3 (ref 0–0.1)
BUN BLDV-MCNC: 11 MG/DL (ref 7–22)
CALCIUM SERPL-MCNC: 9.5 MG/DL (ref 8.5–10.5)
CHLORIDE BLD-SCNC: 102 MEQ/L (ref 98–111)
CO2: 26 MEQ/L (ref 23–33)
CREAT SERPL-MCNC: 0.5 MG/DL (ref 0.4–1.2)
EOSINOPHIL # BLD: 3 %
EOSINOPHILS ABSOLUTE: 0.2 THOU/MM3 (ref 0–0.4)
ERYTHROCYTE [DISTWIDTH] IN BLOOD BY AUTOMATED COUNT: 13.9 % (ref 11.5–14.5)
ERYTHROCYTE [DISTWIDTH] IN BLOOD BY AUTOMATED COUNT: 46.4 FL (ref 35–45)
GFR SERPL CREATININE-BSD FRML MDRD: > 90 ML/MIN/1.73M2
GLUCOSE BLD-MCNC: 87 MG/DL (ref 70–108)
HCT VFR BLD CALC: 41.9 % (ref 37–47)
HEMOGLOBIN: 13.4 GM/DL (ref 12–16)
IMMATURE GRANS (ABS): 0.05 THOU/MM3 (ref 0–0.07)
IMMATURE GRANULOCYTES: 0.7 %
LYMPHOCYTES # BLD: 32.1 %
LYMPHOCYTES ABSOLUTE: 2.2 THOU/MM3 (ref 1–4.8)
MCH RBC QN AUTO: 29 PG (ref 26–33)
MCHC RBC AUTO-ENTMCNC: 32 GM/DL (ref 32.2–35.5)
MCV RBC AUTO: 90.7 FL (ref 81–99)
MONOCYTES # BLD: 10.7 %
MONOCYTES ABSOLUTE: 0.7 THOU/MM3 (ref 0.4–1.3)
NUCLEATED RED BLOOD CELLS: 0 /100 WBC
PLATELET # BLD: 311 THOU/MM3 (ref 130–400)
PMV BLD AUTO: 8.7 FL (ref 9.4–12.4)
POTASSIUM SERPL-SCNC: 4.1 MEQ/L (ref 3.5–5.2)
RBC # BLD: 4.62 MILL/MM3 (ref 4.2–5.4)
SEG NEUTROPHILS: 53.1 %
SEGMENTED NEUTROPHILS ABSOLUTE COUNT: 3.7 THOU/MM3 (ref 1.8–7.7)
SODIUM BLD-SCNC: 138 MEQ/L (ref 135–145)
TSH SERPL DL<=0.05 MIU/L-ACNC: 1.06 UIU/ML (ref 0.4–4.2)
WBC # BLD: 6.9 THOU/MM3 (ref 4.8–10.8)

## 2022-03-04 PROCEDURE — 85025 COMPLETE CBC W/AUTO DIFF WBC: CPT

## 2022-03-04 PROCEDURE — 84443 ASSAY THYROID STIM HORMONE: CPT

## 2022-03-04 PROCEDURE — 80048 BASIC METABOLIC PNL TOTAL CA: CPT

## 2022-03-04 PROCEDURE — 36415 COLL VENOUS BLD VENIPUNCTURE: CPT

## 2022-03-07 ENCOUNTER — TELEPHONE (OUTPATIENT)
Dept: FAMILY MEDICINE CLINIC | Age: 52
End: 2022-03-07

## 2022-03-07 NOTE — TELEPHONE ENCOUNTER
----- Message from BLAIR Gaspar CNP sent at 3/4/2022  4:37 PM EST -----  Let pt know labs are in appropriate range

## 2022-03-29 ENCOUNTER — OFFICE VISIT (OUTPATIENT)
Dept: FAMILY MEDICINE CLINIC | Age: 52
End: 2022-03-29
Payer: COMMERCIAL

## 2022-03-29 VITALS
HEIGHT: 67 IN | DIASTOLIC BLOOD PRESSURE: 86 MMHG | TEMPERATURE: 98.1 F | WEIGHT: 155.8 LBS | HEART RATE: 86 BPM | SYSTOLIC BLOOD PRESSURE: 136 MMHG | BODY MASS INDEX: 24.45 KG/M2 | RESPIRATION RATE: 14 BRPM | OXYGEN SATURATION: 99 %

## 2022-03-29 DIAGNOSIS — J32.1 CHRONIC FRONTAL SINUSITIS: ICD-10-CM

## 2022-03-29 DIAGNOSIS — R06.83 SNORES: ICD-10-CM

## 2022-03-29 DIAGNOSIS — J30.89 NON-SEASONAL ALLERGIC RHINITIS DUE TO OTHER ALLERGIC TRIGGER: Primary | ICD-10-CM

## 2022-03-29 PROBLEM — J30.9 ALLERGIC RHINITIS DUE TO ALLERGEN: Status: ACTIVE | Noted: 2022-03-29

## 2022-03-29 PROCEDURE — 99213 OFFICE O/P EST LOW 20 MIN: CPT | Performed by: NURSE PRACTITIONER

## 2022-03-29 ASSESSMENT — ENCOUNTER SYMPTOMS
VOICE CHANGE: 0
TROUBLE SWALLOWING: 0
CHOKING: 0
SINUS PAIN: 0
RHINORRHEA: 1
SHORTNESS OF BREATH: 0
PHOTOPHOBIA: 0
SORE THROAT: 0
WHEEZING: 0
CHEST TIGHTNESS: 0
SINUS PRESSURE: 0
COUGH: 0

## 2022-03-29 NOTE — PATIENT INSTRUCTIONS
Patient Education        Chronic Sinusitis: Care Instructions  Your Care Instructions     Sinusitis is an infection of the lining of the sinus cavities in your head. Itcauses pain and pressure in your head and face. Sinusitis can be short-term (acute) or long-term (chronic). Chronic sinusitis lasts 12 weeks or longer. It is often caused by a bacterial or fungalinfection. Other things, such as allergies, may also be involved. Chronic sinusitis may be hard to treat. It can lead to permanent changes in the mucous membranes that line the sinuses. It may make future sinus infectionsmore likely. The infection may take some time to treat. Antibiotics are usually used if the infection is caused by bacteria. You may also need to use a corticosteroid nasal spray. If the infection is not cured after you try two or more different antibiotics, you may want to talk with your doctor about surgery or allergytesting. If the sinusitis is caused by a fungal infection, you may need to takeantifungals or other medicines. You may also need surgery. Follow-up care is a key part of your treatment and safety. Be sure to make and go to all appointments, and call your doctor if you are having problems. It's also a good idea to know your test results and keep alist of the medicines you take. How can you care for yourself at home? Medicines     Be safe with medicines. Take your medicines exactly as prescribed. Call your doctor if you think you are having a problem with your medicine. You will get more details on the specific medicines your doctor prescribes.      Take your antibiotics as directed. Do not stop taking them just because you feel better. You need to take the full course of antibiotics.      Your doctor may recommend a corticosteroid nasal spray, wash, drops, or pills. Take this medicine exactly as prescribed. At home     Breathe warm, moist air.  You can use a steamy shower, a hot bath, or a sink filled with hot water. Avoid cold, dry air. Using a humidifier in your home may help. Follow the instructions for cleaning the machine.      Use saline (saltwater) nasal washes every day. This helps keep your nasal passages open. It also can wash out mucus and bacteria. ? You can buy saline nose drops at a grocery store or drugstore. ? You can make your own at home. Add 1 teaspoon of salt and 1 teaspoon of baking soda to 2 cups of distilled water. If you make your own, fill a bulb syringe with the solution. Then insert the tip into your nostril and squeeze gently. Jacki Clan your nose.      Put a warm, wet towel or a warm gel pack on your face 3 or 4 times a day. Leave it on 5 to 10 minutes each time.      Do not smoke or breathe secondhand smoke. Smoking can make sinusitis worse. If you need help quitting, talk to your doctor about stop-smoking programs and medicines. These can increase your chances of quitting for good. When should you call for help? Call your doctor now or seek immediate medical care if:     You have new or worse symptoms of infection, such as:  ? Increased pain, swelling, warmth, or redness. ? Red streaks leading from the area. ? Pus draining from the area. ? A fever. Watch closely for changes in your health, and be sure to contact your doctor if:     The mucus from your nose becomes thicker (like pus) or has new blood in it.      You do not get better as expected. Where can you learn more? Go to https://LeanApps.Manpacks. org and sign in to your [employer] account. Enter U319 in the [facility] box to learn more about \"Chronic Sinusitis: Care Instructions. \"     If you do not have an account, please click on the \"Sign Up Now\" link. Current as of: September 8, 2021               Content Version: 13.2  © 7692-5521 Healthwise, Incorporated. Care instructions adapted under license by [facility] ([facility]).  If you have questions about a medical condition or this instruction, always ask your healthcare professional. [address] any warranty or liability for your use of this information. Patient Education        Managing Your Allergies: Care Instructions  Your Care Instructions     Managing your allergies is an important part of staying healthy. Your doctor will help you find out what may be the cause of the allergies. Common causes ofsymptoms are house dust and dust mites, animal dander, mold, and pollen. As soon as you know what triggers your symptoms, try to avoid those things. This can help prevent allergy symptoms, asthma, and other health problems. Ask your doctor about allergy medicine or immunotherapy. These treatments mayhelp reduce or prevent allergy symptoms. Follow-up care is a key part of your treatment and safety. Be sure to make and go to all appointments, and call your doctor if you are having problems. It's also a good idea to know your test results and keep alist of the medicines you take. How can you care for yourself at home?  If you have been told by your doctor that dust or dust mites are causing your allergy, decrease the dust around your bed:  ? Wash sheets, pillowcases, and other bedding in hot water every week. ? Use dust-proof covers for pillows, duvets, and mattresses. Avoid plastic covers because they tear easily and do not \"breathe. \" Wash as instructed on the label. ? Do not use any blankets and pillows that you do not need. ? Use blankets that you can wash in your washing machine. ? Consider removing drapes and carpets, which attract and hold dust, from your bedroom.  If you are allergic to house dust and mites, do not use home humidifiers. Your doctor can suggest ways you can control dust and mites.  Look for signs of cockroaches. Cockroaches cause allergic reactions. Use cockroach baits to get rid of them. Then, clean your home well.  Cockroaches like areas where grocery bags, newspapers, empty bottles, or cardboard boxes are stored. Do not keep these inside your home, and keep trash and food containers sealed. Seal off any spots where cockroaches might enter your home.  If you are allergic to mold, get rid of furniture, rugs, and drapes that smell musty. Check for mold in the bathroom.  If you are allergic to outdoor pollen or mold spores, use air-conditioning. Change or clean all filters every month. Keep windows closed.  If you are allergic to pollen, stay inside when pollen counts are high. Use a vacuum  with a HEPA filter or a double-thickness filter at least two times each week.  Stay inside when air pollution is bad. Avoid paint fumes, perfumes, and other strong odors.  Avoid conditions that make your allergies worse. Stay away from smoke. Do not smoke or let anyone else smoke in your house. Do not use fireplaces or wood-burning stoves.  If you are allergic to your pets, change the air filter in your furnace every month. Use high-efficiency filters.  If you are allergic to pet dander, keep pets outside or out of your bedroom. Old carpet and cloth furniture can hold a lot of animal dander. You may need to replace them. When should you call for help? Give an epinephrine shot if:     You think you are having a severe allergic reaction. After giving an epinephrine shot call 911, even if you feel better. Call 911 if:     You have symptoms of a severe allergic reaction. These may include:  ? Sudden raised, red areas (hives) all over your body. ? Swelling of the throat, mouth, lips, or tongue. ? Trouble breathing. ? Passing out (losing consciousness). Or you may feel very lightheaded or suddenly feel weak, confused, or restless.      You have been given an epinephrine shot, even if you feel better. Call your doctor now or seek immediate medical care if:     You have symptoms of an allergic reaction, such as:  ? A rash or hives (raised, red areas on the skin). ? Itching. ? Swelling. ?  Belly pain, nausea, or vomiting. Watch closely for changes in your health, and be sure to contact your doctor if:     Your allergies get worse.      You need help controlling your allergies.      You have questions about allergy testing.      You do not get better as expected. Where can you learn more? Go to https://chpeedmundoeweb.[employer]. org and sign in to your [employer] account. Enter L249 in the [employer] box to learn more about \"Managing Your Allergies: Care Instructions. \"     If you do not have an account, please click on the \"Sign Up Now\" link. Current as of: February 10, 2021               Content Version: 13.2  © 5544-0784 Healthwise, Incorporated. Care instructions adapted under license by [facility] ([facility]). If you have questions about a medical condition or this instruction, always ask your healthcare professional. [address] any warranty or liability for your use of this information.

## 2022-03-29 NOTE — PROGRESS NOTES
[facility]  [address] [place]O [facility] [place] [address]  Dept: [phone]  Dept Fax: [phone]  Loc: [phone]    Mack Francisco is a 54 y.o. femalewho presents today for her medical conditions/complaints as noted below. Vanessa Singh c/o of 1 Month Follow-Up (allergies)      HPI:      Pt here to f/u from her visit on 3/1/2022 when she was seen for allergic rhinitis and medication changes were made. Pt is taking xyzal daily and nasacort daily. She had bene on flonase without any relief. Pt states since starting the new medication she can tell her nasal congestion is better and drainage is less. Likes these new medications better. Her snoring did lessen a bit but does have a sleep study scheduled. HPI as below from 3/1/2022. Pt here to discuss allergies. Allergies    Symptoms:  Clear nasal dranage, post nasal drainage, coughing, ear congestion and sinus pressure. Has had for year did have sinus surgery in 2018 with mild improvement. Current treatment:  Pt has tried and failed flonase, sudafed, claritin and zyrtec  Triggers:  Weather changes  Pets:unknown   Smoker: non-smoker  Other smokers in the home:No    Pt also states her  told her she snores every night, she has tried the nasal strips and she continues to snore. States she does wake up tired ruing the morning, does not have any trouble falling asleep no daytime sleepiness. Needs labs updated family history of HTN and thyroid disorder.                  Current Outpatient Medications   Medication Sig Dispense Refill    levocetirizine (XYZAL) 5 MG tablet Take 1 tablet by mouth nightly 90 tablet 1    triamcinolone (NASACORT) 55 MCG/ACT nasal inhaler 2 sprays by Each Nostril route daily 1 each 3    pseudoephedrine (SUDAFED) 30 MG tablet Take 30 mg by mouth every 4 hours as needed for Congestion      Multiple Vitamins-Minerals (THERAPEUTIC MULTIVITAMIN-MINERALS) tablet Take 1 tablet by mouth daily       No current facility-administered medications for this visit. Past Medical History:   Diagnosis Date    Headache       Past Surgical History:   Procedure Laterality Date    BRAIN SURGERY      tumor removed      SECTION      x1    HYSTERECTOMY      SP US GUID NDL BIOPSY RIGHT Right         OTHER SURGICAL HISTORY      growth removed under arm    [place] REPAIR OF NASAL SEPTUM Bilateral 2018    IGS ENDOSCOPIC POSSIBLE FRONTAL BILATERAL, BILATERAL MAXILLARY ANTROSTOMY, BILATERAL ETHMOIDECTOMIES, SEPTOPLASTY, SUBMUCOUS RESECT TTURBINATES (SMR) PARTIAL performed by Vilma Soria MD at [facility]     Family History   Problem Relation Age of Onset    Bipolar Disorder Mother     Hypertension Mother    [facility] Schizophrenia Mother     Mental Illness Mother     High Blood Pressure Mother     High Cholesterol Mother     Heart Disease Father     Asthma Neg Hx     Birth Defects Neg Hx     Depression Neg Hx     Diabetes Neg Hx     Early Death Neg Hx     Hearing Loss Neg Hx      Social History     Tobacco Use    Smoking status: Never Smoker    Smokeless tobacco: Never Used   Substance Use Topics    Alcohol use: No        Allergies   Allergen Reactions    Seasonal        Health Maintenance   Topic Date Due    Colorectal Cancer Screen  Never done    Flu vaccine (1) 2021    Depression Screen  2023    Lipid screen  2026    DTaP/Tdap/Td vaccine (2 - Td or Tdap) 2031    COVID-19 Vaccine  Completed    Hepatitis A vaccine  Aged Out    Hepatitis B vaccine  Aged Out    Hib vaccine  Aged Out    Meningococcal (ACWY) vaccine  Aged Out    Pneumococcal 0-64 years Vaccine  Aged Out    Hepatitis C screen  Discontinued    HIV screen  Discontinued       Subjective:      Review of Systems   Constitutional: Negative for chills, fatigue and fever.    HENT: Positive for congestion (nasal ), postnasal drip and rhinorrhea. Negative for ear pain (ear congestion ), sinus pressure, sinus pain, sore throat, tinnitus, trouble swallowing and voice change. Eyes: Negative for photophobia and visual disturbance. Respiratory: Negative for cough, choking, chest tightness, shortness of breath and wheezing. Cardiovascular: Negative. Skin: Negative. Allergic/Immunologic: Positive for environmental allergies. Psychiatric/Behavioral: Negative for self-injury, sleep disturbance and suicidal ideas. Objective:      /86   Pulse 86   Temp 98.1 °F (36.7 °C) (Oral)   Resp 14   Ht 5' 7\" (1.702 m)   Wt 155 lb 12.8 oz (70.7 kg)   SpO2 99%   BMI 24.40 kg/m²      Physical Exam  Vitals and nursing note reviewed. Constitutional:       Appearance: She is normal weight. She is not ill-appearing. HENT:      Head:      Comments: Right nares open, mild congestion remains in left nares but improved. Right Ear: Hearing, ear canal and external ear normal. No middle ear effusion. There is no impacted cerumen. Left Ear: Hearing, ear canal and external ear normal.  No middle ear effusion. There is no impacted cerumen. Nose: Congestion and rhinorrhea present. No nasal deformity, septal deviation or nasal tenderness. Right Nostril: No occlusion. Left Nostril: No occlusion. Right Turbinates: Not enlarged, swollen or pale. Left Turbinates: Swollen. Not enlarged or pale. Right Sinus: No maxillary sinus tenderness or frontal sinus tenderness. Left Sinus: No maxillary sinus tenderness or frontal sinus tenderness. Neck:      Thyroid: No thyroid mass, thyromegaly or thyroid tenderness. Cardiovascular:      Rate and Rhythm: Normal rate and regular rhythm. Pulses: Normal pulses. Heart sounds: Normal heart sounds. No murmur heard. Pulmonary:      Effort: Pulmonary effort is normal. No respiratory distress. Breath sounds: Normal breath sounds. No wheezing. Lymphadenopathy:      Cervical: No cervical adenopathy. Skin:     General: Skin is warm and dry. Capillary Refill: Capillary refill takes less than 2 seconds. Neurological:      Mental Status: She is alert. Psychiatric:         Mood and Affect: Mood normal.         Behavior: Behavior normal.         Thought Content: Thought content normal.         Judgment: Judgment normal.          Assessment/Plan:             Encounter Diagnoses   Name Primary?  Non-seasonal allergic rhinitis due to other allergic trigger Yes    Chronic frontal sinusitis     Snores      Pt condition improved  Continue meds as ordered. Pt in agreement with plan   Return in about 6 months (around 9/29/2022) for physical exam.    Reccommended tobaccocessation options including pharmacologic methods, counseled great than 3 minutesduring this visit:  Yes[]  No  []       Patient given educational materials -see patient instructions. Discussed use, benefit, and side effects of prescribedmedications. All patient questions answered. Pt voiced understanding. Reviewedhealth maintenance. Instructed to continue current medications, diet and exercise. Patient agreed with treatment plan. Follow up as directed.        Electronicallysigned by BLAIR Llanes CNP on 3/29/2022 at 10:05 AM

## 2022-05-03 ENCOUNTER — INITIAL CONSULT (OUTPATIENT)
Dept: PULMONOLOGY | Age: 52
End: 2022-05-03
Payer: COMMERCIAL

## 2022-05-03 VITALS
OXYGEN SATURATION: 99 % | WEIGHT: 154 LBS | DIASTOLIC BLOOD PRESSURE: 82 MMHG | BODY MASS INDEX: 24.17 KG/M2 | TEMPERATURE: 97.3 F | HEART RATE: 95 BPM | SYSTOLIC BLOOD PRESSURE: 134 MMHG | HEIGHT: 67 IN

## 2022-05-03 DIAGNOSIS — J34.89 NASAL OBSTRUCTION: ICD-10-CM

## 2022-05-03 DIAGNOSIS — G47.9 SLEEP DISTURBANCE: ICD-10-CM

## 2022-05-03 DIAGNOSIS — R06.83 LOUD SNORING: Primary | ICD-10-CM

## 2022-05-03 PROCEDURE — 99203 OFFICE O/P NEW LOW 30 MIN: CPT | Performed by: INTERNAL MEDICINE

## 2022-05-03 NOTE — PROGRESS NOTES
New Sleep Patient H/P    Presentation:  Melchor Blas is referred by Baltazar Cassidy for snoring, fatigue. No prior sleep study      Melchor Blas is a 55 y/o lady who c/o loud disruptive snoring, her  frequently wakes her up so that  she will stop snoring. Melchor Blas feels that her sleep is not as refreshing as it used to be, sometimes throughout the day she feels tired and experiencing some difficulty concentrating. Has difficulty breathing through the nose uses Nasacort and Cetirizine, has extensive sinus surgery few years ago. Time in Bed:   Bedtime: 12 a.m. Awakens  10 a.m. Different on weekends? No       Vanessa falls asleep in 5  minutes. Any awakenings? Yes 3 to 4  Difficulty Falling back to sleep? No        Previous evaluation and treatment? No        She denies any history of sleep walking or sleep talking. No history of seizures activity. No history suggestive of restless legs syndrome. No history of bruxism. No history of head injury. Naps:  Any naps? No     Snoring and Apneas:  Do you snore or been told you a snore? Yes  How long have known about your snoring? years  Any witnessed apneas? No  Any awakenings with choking or gasping? No    Dreams:  Any recurring dreams? No  Hallucinations? No  Sleep Paralysis? No  Symptoms of Cataplexy? No    Driving History:  Do you have a CDL or drive long distances for work? No  Any driving accidents in the past year? No  Any sleepiness while driving? No    Weight:  Any change in weight over the past year? No   How about past 5 years?  No      Past Medical History:   Diagnosis Date    Headache        Past Surgical History:   Procedure Laterality Date    BRAIN SURGERY      tumor removed      SECTION      x1    HYSTERECTOMY       [facility] [address] BIOPSY RIGHT Right         OTHER SURGICAL HISTORY      growth removed under arm    [place] REPAIR OF NASAL SEPTUM Bilateral 2018    IGS ENDOSCOPIC POSSIBLE FRONTAL BILATERAL, BILATERAL MAXILLARY ANTROSTOMY, BILATERAL ETHMOIDECTOMIES, SEPTOPLASTY, SUBMUCOUS RESECT TTURBINATES (SMR) PARTIAL performed by Flor Us MD at [facility]       Social History     Tobacco Use    Smoking status: Never Smoker    Smokeless tobacco: Never Used   Vaping Use    Vaping Use: Never used   Substance Use Topics    Alcohol use: No    Drug use: No       Allergies   Allergen Reactions    Seasonal        Current Outpatient Medications   Medication Sig Dispense Refill    Sodium Sulfate-Mag Sulfate-KCl (SUTAB) [id number] MG TABS Take 12 tablets by mouth See Admin Instructions Colonoscopy prep 24 tablet 0    levocetirizine (XYZAL) 5 MG tablet Take 1 tablet by mouth nightly 90 tablet 1    triamcinolone (NASACORT) 55 MCG/ACT nasal inhaler 2 sprays by Each Nostril route daily 1 each 3    pseudoephedrine (SUDAFED) 30 MG tablet Take 30 mg by mouth every 4 hours as needed for Congestion      Multiple Vitamins-Minerals (THERAPEUTIC MULTIVITAMIN-MINERALS) tablet Take 1 tablet by mouth daily       No current facility-administered medications for this visit. Family History   Problem Relation Age of Onset    Bipolar Disorder Mother     Hypertension Mother    Ng Schizophrenia Mother     Mental Illness Mother     High Blood Pressure Mother     High Cholesterol Mother     Heart Disease Father     Breast Cancer Sister     Breast Cancer Maternal Grandmother     Pancreatic Cancer Paternal Grandmother     Colon Cancer Paternal Uncle     Prostate Cancer Paternal Uncle     Asthma Neg Hx     Birth Defects Neg Hx     Depression Neg Hx     Diabetes Neg Hx     Early Death Neg Hx     Hearing Loss Neg Hx         Any family history of any sleep problems or any one in your family on CPAP?  No    Social History     Tobacco Use    Smoking status: Never Smoker    Smokeless tobacco: Never Used   Vaping Use    Vaping Use: Never used   Substance Use Topics    Alcohol use: No    Drug use: No     Caffeine Intake: How much soda (pop), coffee, tea, power drinks do you ingest per day?0 per day. 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 No fever or chills. HENT: Negative. Eyes: Negative. Upper respiratory tract: nasal congestion, nasal obstruction  Lower respiratory tract/ lungs: No cough or sputum production. No hemoptysis. Cardiovascular: No palpitations or chest pain. Gastrointestinal: No nausea or vomiting. Neurological: No focal neurologiacal weakness. Extremities: No edema. Musculoskeletal: No complaints. Genitourinary: No complaints. Hematological: Negative. Psychiatric/Behavioral: Negative. Skin: No itching. Physical Exam:    HEIGHTHeight: 5' 7\" (170.2 cm) WEIGHTWeight: 154 lb (69.9 kg)    BMI:  Body mass index is 24.03 kg/m². Neck Size: 14  Oxygen Sat: 99 % on ra  SAQLI 61  ESS: 8   Vitals: Ht 5' 7\" (1.702 m)   BMI 24.03 kg/m²       Mallampati Score: 3    Physical Exam :  Constitutional: Moderately built and moderately nourished. No distress. HENT:   Head: Normocephalic and atraumatic. Nose: bilateral nasal congestion, reduced air movement. Mouth/Throat: Mallampati 1  Eyes: Conjunctivae are normal. PERRLA. No scleral icterus. Neck: Neck supple. No JVD present. No tracheal deviation present. Cardiovascular: Normal rate, regular rhythm, normal heart sounds. No murmur heard. Pulmonary/Chest: Effort normal and breath sounds normal. No stridor. No respiratory distress. No wheezes. No rales. Abdominal: Soft. No distension. No tenderness. Musculoskeletal: Normal range of motion. Lymphadenopathy:  No cervical adenopathy. Neurological: Alert and oriented to person, place, and time. No focal deficits. Skin: Skin is warm and dry. Patient is not diaphoretic.    Psychiatric: Normal behavior with normal mood and affect. Diagnostic Data:    Assessment      Diagnosis Orders   1. Loud snoring  Home Sleep Study   2. Sleep disturbance  Home Sleep Study   3. Nasal obstruction  Home Sleep Study         Plan       Orders Placed This Encounter   Procedures    Home Sleep Study     Standing Status:   Future     Standing Expiration Date:   5/3/2023     Order Specific Question:   Location For Sleep Study     Answer:   [facility]     Order Specific Question:   Select Sleep Lab Location     Answer:   [facility]          Mask Desensitization and Pre study teaching? No  Weight Loss Information Given? No  Sleep Hygiene Discussed? Yes    -She was advised to call [employer] regarding supplies if needed.  -She call my office for earlier appointment if needed for worsening of sleep symptoms.  -Gina Hill educated about my impression and plan. Patient verbalizes understanding.

## 2022-05-12 LAB
CHOLESTEROL, TOTAL: 213 MG/DL (ref 0–199)
FASTING: YES
GLUCOSE BLD-MCNC: 91 MG/DL (ref 74–109)
HDLC SERPL-MCNC: 58 MG/DL (ref 40–90)
LDL CHOLESTEROL CALCULATED: 134 MG/DL
TRIGL SERPL-MCNC: 106 MG/DL (ref 0–199)

## 2022-06-01 ENCOUNTER — HOSPITAL ENCOUNTER (OUTPATIENT)
Dept: SLEEP CENTER | Age: 52
Discharge: HOME | End: 2022-06-03
Payer: COMMERCIAL

## 2022-06-01 DIAGNOSIS — J34.89 NASAL OBSTRUCTION: ICD-10-CM

## 2022-06-01 DIAGNOSIS — G47.9 SLEEP DISTURBANCE: ICD-10-CM

## 2022-06-01 DIAGNOSIS — R06.83 LOUD SNORING: ICD-10-CM

## 2022-06-01 PROCEDURE — 95806 SLEEP STUDY UNATT&RESP EFFT: CPT

## 2022-06-01 NOTE — PROGRESS NOTES
Zafar John presents today for a HST instruction and demonstration on unit # [id number]. Questions were asked and answers given. She was able to return demonstration and verbalized understanding. The sleep center control room phone number was provided in case questions arise during the study. Informed patient to call 911 in case of an emergency. She states she will return the unit tomorrow before 1000. Covid screening questions asked.

## 2022-06-02 LAB — STATUS: NORMAL

## 2022-06-03 NOTE — PROGRESS NOTES
[address] [place] [address]                               SLEEP STUDY REPORT    PATIENT NAME: Coleman Herman                 :        1970  MED REC NO:   [id number]                           ROOM:  ACCOUNT NO:   [de-identified]                           ADMIT DATE: 2022  PROVIDER:     Marcella Hou. MD Tanya    DATE OF STUDY:  2022    PORTABLE/HOME SLEEP STUDY REPORT    REFERRING PROVIDER:  Francesca Klinefelter, CNP    The patient's height is 67 inches, weight is 154 pounds with a BMI of  24.1. HISTORY:  The patient is a 42-year-old female was evaluated by David Weinstein MD, on 2022. The patient currently suffering with  disruptive snoring. Her  is waking her up during her sleep. Due  to her loud snoring the patient was scheduled for sleep study to  evaluate for sleep apnea as an etiology for disruptive sleep with  non-refreshing sensation. The patient also having difficulty in  concentrating. METHODS:  The patient underwent Type III Portable Monitoring Sleep Study  including the simultaneous recording of oral-nasal airflow, rib and  abdominal respiratory effort, pulse rate, oxygen saturation, and body  position. Sleep staging and scoring followed the standard put forth by  the American Academy of Sleep Medicine and utilized the 1B obstructive  hypopnea event desaturation of 4 percent or greater. INTERPRETATION:  This is a portable sleep study and the study was  performed on 2022. The study was started at 12:30 a.m. and was  terminated at 09:00 a.m. with a total monitoring time of 509.1 minutes. RESPIRATORY EVENT ANALYSIS:  Revealed the patient had a total of 157  apneas. Out of 157, 156 were obstructive and 1 was central in nature. The patient also had a total of 13 hypopneas, all of them were  obstructive in nature.   The total number of apneas and hypopneas  recorded during the study was 170 with respiratory effort index (JOS)  was 20. OXYGEN SATURATION MONITORING:  Revealed the patient had a maximum oxygen  desaturation to 87% with a mean oxygen saturation of 95%. The patient  spent a total of 0.1 minutes below oxygen saturation less than 88%. POSITION ANALYSIS:  Revealed the patient spent 242.3 minutes in supine  position, 266.8 minutes in non-supine position. EKG MONITORING:  Revealed normal sinus rhythm. IMPRESSION:  1. Moderately severe obstructive sleep apnea. 2.  Disruptive sleep. RECOMMENDATIONS:  The patient should be scheduled for followup with  Luisa Stephens MD, clinic as soon as possible to discuss about the sleep  study findings for further management. Thanks to Long Padilla CNP, and Luisa Stephens MD, for giving me this  opportunity to participate in the care of this pleasant lady. This sleep study was dictated due to current unavailability of Dr. Luisa Stephens, to read the sleep study.         Marlyn Ellis MD    D: 06/02/2022 17:03:16       T: 06/03/2022 13:34:57     [id number]  Job#: [id number]     Doc#: [id number]    CC:

## 2022-06-21 ENCOUNTER — OFFICE VISIT (OUTPATIENT)
Dept: PULMONOLOGY | Age: 52
End: 2022-06-21
Payer: COMMERCIAL

## 2022-06-21 VITALS
BODY MASS INDEX: 24.71 KG/M2 | DIASTOLIC BLOOD PRESSURE: 72 MMHG | OXYGEN SATURATION: 96 % | WEIGHT: 157.4 LBS | HEIGHT: 67 IN | HEART RATE: 82 BPM | SYSTOLIC BLOOD PRESSURE: 128 MMHG | TEMPERATURE: 98.3 F

## 2022-06-21 DIAGNOSIS — G47.33 OSA (OBSTRUCTIVE SLEEP APNEA): Primary | ICD-10-CM

## 2022-06-21 DIAGNOSIS — G47.9 SLEEP DISTURBANCE: ICD-10-CM

## 2022-06-21 DIAGNOSIS — R06.83 LOUD SNORING: ICD-10-CM

## 2022-06-21 PROCEDURE — 99214 OFFICE O/P EST MOD 30 MIN: CPT | Performed by: NURSE PRACTITIONER

## 2022-06-21 ASSESSMENT — ENCOUNTER SYMPTOMS
SHORTNESS OF BREATH: 0
WHEEZING: 0
VOMITING: 0
NAUSEA: 0
ABDOMINAL PAIN: 0
COUGH: 0
EYES NEGATIVE: 1
DIARRHEA: 0
APNEA: 1

## 2022-06-21 NOTE — PROGRESS NOTES
[facility]    Lon Torres, 50 y.o.  [id number]      Pt of [facility]    SAQLI 81   ESS 8  Study Results  HST Date -  6/1/22  JOS -  20.0    TotalEvents - 170  (Apneas  156  Hypopneas 13  Central  1)  Total Sleep Time - 509.1 min  Time with Sats below 88% - 0.1 min  Interval History       Lon Torres is a 50 y.o. old female who comes in to review the results of her recent sleep study, to answer questions and to explore options for treatment. she continues to have symptoms of snoring, excessive daytime sleepiness, sinus problems, congested nose  History of sinus surgery per Dr Brea Roe MD .  Pt reports benefit from surgery but still trouble breathing out of left side of nose   Allergies  Allergies   Allergen Reactions    Seasonal      Meds  Current Outpatient Medications   Medication Sig Dispense Refill    levocetirizine (XYZAL) 5 MG tablet Take 1 tablet by mouth nightly 90 tablet 1    triamcinolone (NASACORT) 55 MCG/ACT nasal inhaler 2 sprays by Each Nostril route daily 1 each 3    pseudoephedrine (SUDAFED) 30 MG tablet Take 30 mg by mouth every 4 hours as needed for Congestion       No current facility-administered medications for this visit. ROS  Review of Systems   Constitutional: Positive for fatigue. Negative for activity change, appetite change, chills, fever and unexpected weight change. HENT: Positive for congestion. Eyes: Negative. Respiratory: Positive for apnea. Negative for cough, shortness of breath and wheezing. Cardiovascular: Negative for chest pain, palpitations and leg swelling. Gastrointestinal: Negative for abdominal pain, diarrhea, nausea and vomiting. Genitourinary: Negative. Musculoskeletal: Negative. Skin: Negative. Neurological: Negative. Hematological: Negative. Psychiatric/Behavioral: Negative. Negative for sleep disturbance.           Exam  Vitals -  /72 (Site: Left Upper Arm, Position: Sitting, Cuff Size: Medium Adult)   Pulse 82   Temp 98.3 °F (36.8 °C) (Oral)   Ht 5' 7\" (1.702 m)   Wt 157 lb 6.4 oz (71.4 kg)   SpO2 96%   BMI 24.65 kg/m²    Body mass index is 24.65 kg/m². Oxygen level - Room Air  Physical Exam  Vitals and nursing note reviewed. Constitutional:       Appearance: Normal appearance. She is normal weight. HENT:      Head: Normocephalic and atraumatic. Mouth/Throat:      Pharynx: Oropharynx is clear. Eyes:      Conjunctiva/sclera: Conjunctivae normal.   Pulmonary:      Effort: Pulmonary effort is normal. No tachypnea, bradypnea or respiratory distress. Skin:     Findings: No erythema or rash. Neurological:      Mental Status: She is alert and oriented to person, place, and time. Psychiatric:         Attention and Perception: Attention normal.         Mood and Affect: Mood normal.         Speech: Speech normal.         Behavior: Behavior normal.         Thought Content: Thought content normal.         Cognition and Memory: Cognition normal.         Judgment: Judgment normal.        Assessment   Diagnosis Orders   1. TYLOR (obstructive sleep apnea)  DME Order for CPAP as OP   2. Loud snoring  DME Order for CPAP as OP   3. Sleep disturbance  DME Order for CPAP as OP      Recommendations  I reviewed the sleep study results in detail with patient. We discussed various treatment options including positional therapy, OAT, wt loss and PAP therapies along with the benefits and limitations of each. We also discussed the health risks with untreated TYLOR. Due to the severity of apnea in both supine and non supine positions: PAP therapies is recommended. Patient agrees to proceed with APAP set up (pending insurance) with mask fitting. Educated on proper sleep hygiene measures. 30 minutes was spent on this visit with > 50% being face to face obtaining HPI, examining patient, reviewing test results, educating and coordinating a treatment plan.       Follow up 8 weeks after PAP set up with download.   Electronically signed by BLAIR Ramirez CNP on 6/21/2022 at 11:42 AM

## 2022-06-21 NOTE — PROGRESS NOTES
Patient is falling asleep without difficulty. Patient is having minimal difficulty remaining asleep, states she uses the restroom at night.     Concerns/Complaints: Snoring

## 2022-09-03 ENCOUNTER — HOSPITAL ENCOUNTER (EMERGENCY)
Age: 52
Discharge: HOME | End: 2022-09-03
Payer: COMMERCIAL

## 2022-09-03 VITALS
DIASTOLIC BLOOD PRESSURE: 86 MMHG | WEIGHT: 155 LBS | BODY MASS INDEX: 24.33 KG/M2 | OXYGEN SATURATION: 100 % | SYSTOLIC BLOOD PRESSURE: 129 MMHG | TEMPERATURE: 98.5 F | HEIGHT: 67 IN | RESPIRATION RATE: 16 BRPM | HEART RATE: 100 BPM

## 2022-09-03 DIAGNOSIS — J32.1 CHRONIC FRONTAL SINUSITIS: ICD-10-CM

## 2022-09-03 DIAGNOSIS — J02.9 ACUTE PHARYNGITIS, UNSPECIFIED ETIOLOGY: Primary | ICD-10-CM

## 2022-09-03 LAB — SARS-COV-2, NAA: NOT  DETECTED

## 2022-09-03 PROCEDURE — 99213 OFFICE O/P EST LOW 20 MIN: CPT | Performed by: NURSE PRACTITIONER

## 2022-09-03 PROCEDURE — 87635 SARS-COV-2 COVID-19 AMP PRB: CPT

## 2022-09-03 PROCEDURE — 99213 OFFICE O/P EST LOW 20 MIN: CPT

## 2022-09-03 RX ORDER — METHYLPREDNISOLONE 4 MG/1
TABLET ORAL
Qty: 1 KIT | Refills: 0 | Status: SHIPPED | OUTPATIENT
Start: 2022-09-03

## 2022-09-03 RX ORDER — AZITHROMYCIN 250 MG/1
250 TABLET, FILM COATED ORAL SEE ADMIN INSTRUCTIONS
Qty: 6 TABLET | Refills: 0 | Status: SHIPPED | OUTPATIENT
Start: 2022-09-03 | End: 2022-09-08

## 2022-09-03 ASSESSMENT — ENCOUNTER SYMPTOMS
COUGH: 1
CHEST TIGHTNESS: 0
SINUS PRESSURE: 1
SHORTNESS OF BREATH: 0
RHINORRHEA: 1
SINUS PAIN: 1
EYE PAIN: 0
GASTROINTESTINAL NEGATIVE: 1
EYE ITCHING: 0
EYE REDNESS: 0

## 2022-09-03 ASSESSMENT — PAIN - FUNCTIONAL ASSESSMENT: PAIN_FUNCTIONAL_ASSESSMENT: 0-10

## 2022-09-03 ASSESSMENT — PAIN SCALES - GENERAL: PAINLEVEL_OUTOF10: 4

## 2022-09-03 ASSESSMENT — PAIN DESCRIPTION - LOCATION: LOCATION: HEAD

## 2022-09-03 NOTE — ED PROVIDER NOTES
[id number]       Chief Complaint   Patient presents with    Cough    Headache    Pharyngitis     Throat feels swollen  covid test at home yesterday negative       Nurses Notes reviewed and I agree except as noted in the HPI. HISTORY OF PRESENT ILLNESS   Rian Dixon is a 50 y.o. female who presents The history is provided by the patient. Sinusitis  Pain details:     Location:  Frontal    Quality:  Aching, burning and pressure    Severity:  Moderate    Duration:  3 days    Timing:  Intermittent  Progression:  Worsening  Chronicity:  Recurrent  Context: allergies    Context: not recent URI    Relieved by:  Nothing  Worsened by:  Lying down, outdoor exposure, position changes, sitting upright and cold drinks  Ineffective treatments:  Acetaminophen, saline sprays, steroid sprays, warm compresses and oral decongestants  Associated symptoms: congestion, cough, fatigue, fever and rhinorrhea    Associated symptoms: no chest pain, no headaches and no shortness of breath    Congestion:     Location:  Nasal    Interferes with sleep: yes      Interferes with eating/drinking: yes    Risk factors: no asthma, no BiPAP, no cystic fibrosis, no diabetes and no nasal polyps        REVIEW OF SYSTEMS     Review of Systems   Constitutional:  Positive for activity change, appetite change, fatigue and fever. HENT:  Positive for congestion, postnasal drip, rhinorrhea, sinus pressure and sinus pain. Eyes:  Negative for pain, redness and itching. Respiratory:  Positive for cough. Negative for chest tightness and shortness of breath. Cardiovascular:  Negative for chest pain and leg swelling. Gastrointestinal: Negative. Endocrine: Negative for cold intolerance and heat intolerance. Genitourinary: Negative. Musculoskeletal:  Positive for myalgias. Skin:  Positive for pallor. Allergic/Immunologic: Positive for environmental allergies. Neurological:  Negative for dizziness, light-headedness and headaches. Hematological:  Negative for adenopathy. Does not bruise/bleed easily. Psychiatric/Behavioral: Negative. PAST MEDICAL HISTORY         Diagnosis Date    Headache        SURGICAL HISTORY     Patient  has a past surgical history that includes Hysterectomy;  section; brain surgery; tumor removal; other surgical history; pr repair of nasal septum (Bilateral, 2018); and SP US GUIDED BREAST BIOPSY W LOC DEVICE 1ST LESION RIGHT (Right). CURRENT MEDICATIONS       Discharge Medication List as of 9/3/2022 11:07 AM        CONTINUE these medications which have NOT CHANGED    Details   Nutritional Supplements (COLD AND FLU PO) Take by mouthHistorical Med      levocetirizine (XYZAL) 5 MG tablet Take 1 tablet by mouth nightly, Disp-90 tablet, R-1Normal      triamcinolone (NASACORT) 55 MCG/ACT nasal inhaler 2 sprays by Each Nostril route daily, Disp-1 each, R-3Normal      pseudoephedrine (SUDAFED) 30 MG tablet Take 30 mg by mouth every 4 hours as needed for CongestionHistorical Med             ALLERGIES     Patient is is allergic to seasonal.    FAMILY HISTORY     Patient'sfamily history includes Bipolar Disorder in her mother; Breast Cancer in her maternal grandmother and sister; Colon Cancer in her paternal uncle; Heart Disease in her father; High Blood Pressure in her mother; High Cholesterol in her mother; Hypertension in her mother; Mental Illness in her mother; Pancreatic Cancer in her paternal grandmother; Prostate Cancer in her paternal uncle; Schizophrenia in her mother. SOCIAL HISTORY     Patient  reports that she has never smoked. She has never used smokeless tobacco. She reports that she does not drink alcohol and does not use drugs. PHYSICAL EXAM     ED TRIAGE VITALS  BP: 129/86, Temp: 98.5 °F (36.9 °C), Heart Rate: 100, Resp: 16, SpO2: 100 %  Physical Exam  Vitals and nursing note reviewed.    Constitutional: Appearance: She is well-developed and normal weight. She is not ill-appearing. HENT:      Head: Normocephalic. Right Ear: Ear canal and external ear normal. Tympanic membrane is erythematous. Left Ear: Ear canal and external ear normal. Tympanic membrane is erythematous. Nose: Congestion and rhinorrhea (green) present. Mouth/Throat:      Mouth: Mucous membranes are dry. Pharynx: Pharyngeal swelling and posterior oropharyngeal erythema present. Tonsils: No tonsillar exudate. 3+ on the right. 2+ on the left. Eyes:      Conjunctiva/sclera: Conjunctivae normal.   Cardiovascular:      Rate and Rhythm: Normal rate and regular rhythm. Pulses: Normal pulses. Heart sounds: Normal heart sounds. Pulmonary:      Effort: Pulmonary effort is normal.      Breath sounds: Normal breath sounds. No wheezing, rhonchi or rales. Abdominal:      General: Abdomen is flat. Palpations: Abdomen is soft. Musculoskeletal:         General: Normal range of motion. Cervical back: Normal range of motion and neck supple. Tenderness present. Lymphadenopathy:      Cervical: Cervical adenopathy present. Skin:     General: Skin is warm and dry. Capillary Refill: Capillary refill takes less than 2 seconds. Coloration: Skin is not pale. Neurological:      General: No focal deficit present. Mental Status: She is alert and oriented to person, place, and time. Mental status is at baseline. Psychiatric:         Mood and Affect: Mood normal.         Behavior: Behavior normal.         Thought Content:  Thought content normal.       DIAGNOSTIC RESULTS   Labs:   Results for orders placed or performed during the hospital encounter of 09/03/22   COVID-19, Rapid   Result Value Ref Range    SARS-CoV-2, JENNIFER NOT  DETECTED NOT DETECTED       IMAGING:  No orders to display     URGENT CARE COURSE:     Vitals:    09/03/22 1041   BP: 129/86   Pulse: 100   Resp: 16   Temp: 98.5 °F (36.9 °C) SpO2: 100%   Weight: 155 lb (70.3 kg)   Height: 5' 7\" (1.702 m)       Medications - No data to display  PROCEDURES:  None  FINALIMPRESSION    I have reviewed the patient's medical history in detail and updated the computerized patient record. HPI/ROS per the patient and caregiver. Overall non toxic in appearance. Answers questions appropriately. Conditions discussed and addressed this visit include:   Patient appears ill but non-toxic and well hydrated. Covid test is negative in clinic. Symptoms appear more consistent with her chronic sinusitis with thick green pnd present. Patient is to take medications as directed. Continue all home medications. Potential side effects of the medications were reviewed with the patient in detail. The patient can increase fluids. The patient can have Tylenol or Motrin for pain and fever as needed. Discussed with patient signs and symptoms that would require emergent evaluation and treatment. The patient will follow-up with their family physician or go to the ER if they develop any worsening symptoms. The patient was discharged in stable condition    1. Acute pharyngitis, unspecified etiology    2. Chronic frontal sinusitis        DISPOSITION/PLAN   DISPOSITION Decision To Discharge 09/03/2022 11:03:51 AM    PATIENT REFERRED TO:  BLAIR King CNP  [facility] [address]  [phone]    In 1 week  As needed, If symptoms worsen  DISCHARGE MEDICATIONS:  Discharge Medication List as of 9/3/2022 11:07 AM        START taking these medications    Details   methylPREDNISolone (MEDROL, MARCIA,) 4 MG tablet Take by mouth per package instructions. , Disp-1 kit, R-0Normal      azithromycin (ZITHROMAX) 250 MG tablet Take 1 tablet by mouth See Admin Instructions for 5 days 500mg on day 1 followed by 250mg on days 2 - 5, Disp-6 tablet, R-0Normal           Discharge Medication List as of 9/3/2022 11:07 AM          BLAIR Butcher CNP Ramses, BLAIR - CNP  09/03/22 1147

## 2022-09-26 ENCOUNTER — TELEPHONE (OUTPATIENT)
Dept: FAMILY MEDICINE CLINIC | Age: 52
End: 2022-09-26

## 2022-09-26 NOTE — TELEPHONE ENCOUNTER
Left message for patient on 9/26/2022 that we had to cancel her appointment on 9/29/2022 due to the provider being out of the office told patient to give our office a call back to reschedule that appointment.

## 2022-09-27 ENCOUNTER — OFFICE VISIT (OUTPATIENT)
Dept: FAMILY MEDICINE CLINIC | Age: 52
End: 2022-09-27
Payer: COMMERCIAL

## 2022-09-27 VITALS
HEIGHT: 67 IN | OXYGEN SATURATION: 95 % | WEIGHT: 159 LBS | DIASTOLIC BLOOD PRESSURE: 74 MMHG | BODY MASS INDEX: 24.96 KG/M2 | HEART RATE: 87 BPM | TEMPERATURE: 97.5 F | SYSTOLIC BLOOD PRESSURE: 128 MMHG | RESPIRATION RATE: 16 BRPM

## 2022-09-27 DIAGNOSIS — J30.89 NON-SEASONAL ALLERGIC RHINITIS DUE TO OTHER ALLERGIC TRIGGER: ICD-10-CM

## 2022-09-27 DIAGNOSIS — Z00.00 ENCOUNTER FOR WELL ADULT EXAM WITHOUT ABNORMAL FINDINGS: Primary | ICD-10-CM

## 2022-09-27 DIAGNOSIS — R09.81 NASAL CONGESTION: ICD-10-CM

## 2022-09-27 DIAGNOSIS — R22.1 NECK FULLNESS: ICD-10-CM

## 2022-09-27 DIAGNOSIS — Z23 IMMUNIZATION DUE: ICD-10-CM

## 2022-09-27 DIAGNOSIS — J31.0 CHRONIC RHINITIS: ICD-10-CM

## 2022-09-27 DIAGNOSIS — Z13.31 DEPRESSION SCREENING NEGATIVE: ICD-10-CM

## 2022-09-27 PROCEDURE — 99396 PREV VISIT EST AGE 40-64: CPT | Performed by: NURSE PRACTITIONER

## 2022-09-27 PROCEDURE — 90471 IMMUNIZATION ADMIN: CPT | Performed by: NURSE PRACTITIONER

## 2022-09-27 PROCEDURE — 90750 HZV VACC RECOMBINANT IM: CPT | Performed by: NURSE PRACTITIONER

## 2022-09-27 PROCEDURE — 99213 OFFICE O/P EST LOW 20 MIN: CPT | Performed by: NURSE PRACTITIONER

## 2022-09-27 RX ORDER — LEVOCETIRIZINE DIHYDROCHLORIDE 5 MG/1
5 TABLET, FILM COATED ORAL NIGHTLY
Qty: 90 TABLET | Refills: 3 | Status: SHIPPED | OUTPATIENT
Start: 2022-09-27

## 2022-09-27 RX ORDER — TRIAMCINOLONE ACETONIDE 55 UG/1
2 SPRAY, METERED NASAL DAILY
Qty: 1 EACH | Refills: 3 | Status: SHIPPED | OUTPATIENT
Start: 2022-09-27

## 2022-09-27 SDOH — ECONOMIC STABILITY: FOOD INSECURITY: WITHIN THE PAST 12 MONTHS, YOU WORRIED THAT YOUR FOOD WOULD RUN OUT BEFORE YOU GOT MONEY TO BUY MORE.: NEVER TRUE

## 2022-09-27 SDOH — ECONOMIC STABILITY: FOOD INSECURITY: WITHIN THE PAST 12 MONTHS, THE FOOD YOU BOUGHT JUST DIDN'T LAST AND YOU DIDN'T HAVE MONEY TO GET MORE.: NEVER TRUE

## 2022-09-27 ASSESSMENT — PATIENT HEALTH QUESTIONNAIRE - PHQ9
2. FEELING DOWN, DEPRESSED OR HOPELESS: 0
SUM OF ALL RESPONSES TO PHQ QUESTIONS 1-9: 0
SUM OF ALL RESPONSES TO PHQ9 QUESTIONS 1 & 2: 0
SUM OF ALL RESPONSES TO PHQ QUESTIONS 1-9: 0
1. LITTLE INTEREST OR PLEASURE IN DOING THINGS: 0
SUM OF ALL RESPONSES TO PHQ QUESTIONS 1-9: 0
DEPRESSION UNABLE TO ASSESS: FUNCTIONAL CAPACITY MOTIVATION LIMITS ACCURACY
SUM OF ALL RESPONSES TO PHQ QUESTIONS 1-9: 0

## 2022-09-27 ASSESSMENT — SOCIAL DETERMINANTS OF HEALTH (SDOH): HOW HARD IS IT FOR YOU TO PAY FOR THE VERY BASICS LIKE FOOD, HOUSING, MEDICAL CARE, AND HEATING?: NOT HARD AT ALL

## 2022-09-27 ASSESSMENT — ENCOUNTER SYMPTOMS
RESPIRATORY NEGATIVE: 1
GASTROINTESTINAL NEGATIVE: 1
RHINORRHEA: 1
SINUS PRESSURE: 0
SINUS PAIN: 0

## 2022-09-27 NOTE — PROGRESS NOTES
Well Adult Note  Name: Davie Osgood Date: 2022   MRN: [id number] Sex: Female   Age: 50 y.o. Ethnicity: Non- / Non    : 1970 Race: Niki Bear / Stefan Hale is here for well adult exam.  History:  Pt eats a wide variety of foods,    Denies depression, eating well  Sleeping well. Allergies    Symptoms:  clear nasal drainage, nasal congestion   Current treatment:  nasacort and xyzal which work well  Triggers:  season changes  Pets:unknown  Smoker: smoker in the house   Other smokers in the home:Yes    Lab Results   Component Value Date    TSH 1.060 2022        Lab Results   Component Value Date    WBC 6.9 2022    HGB 13.4 2022    HCT 41.9 2022    MCV 90.7 2022     2022        Lab Results   Component Value Date/Time     2022 12:49 PM    K 4.1 2022 12:49 PM     2022 12:49 PM    CO2 26 2022 12:49 PM    BUN 11 2022 12:49 PM    CREATININE 0.5 2022 12:49 PM    GLUCOSE 91 2022 10:30 AM    CALCIUM 9.5 2022 12:49 PM          Colonoscopy up to date Mammogram up to date  Had a hysterectomy 2 years ago  Advised to f/u with gyn for pelvic exam.     Review of Systems   HENT:  Positive for postnasal drip and rhinorrhea. Negative for sinus pressure and sinus pain. Had bronchitis several weeks ago still continues to have a loose cough denies wheezing or sob. Respiratory: Negative. Cardiovascular: Negative. Gastrointestinal: Negative. Genitourinary: Negative. Musculoskeletal: Negative. Skin: Negative. Neurological: Negative. Psychiatric/Behavioral:  Negative for self-injury, sleep disturbance and suicidal ideas. Allergies   Allergen Reactions    Seasonal          Prior to Visit Medications    Medication Sig Taking?  Authorizing Provider   triamcinolone (NASACORT) 55 MCG/ACT nasal inhaler 2 sprays by Each Nostril route daily Yes Steve BLAIR Sherman CNP   levocetirizine (XYZAL) 5 MG tablet Take 1 tablet by mouth nightly Yes BLAIR May CNP   pseudoephedrine (SUDAFED) 30 MG tablet Take 30 mg by mouth every 4 hours as needed for Congestion Yes Historical Provider, MD   Nutritional Supplements (COLD AND FLU PO) Take by mouth  Patient not taking: Reported on 2022  Historical Provider, MD   methylPREDNISolone (MEDROL, MARCIA,) 4 MG tablet Take by mouth per package instructions.   Patient not taking: Reported on 2022  BLAIR Ayala CNP         Past Medical History:   Diagnosis Date    Headache        Past Surgical History:   Procedure Laterality Date    BRAIN SURGERY      tumor removed      SECTION      x1    HYSTERECTOMY (CERVIX STATUS UNKNOWN)      SP US GUID NDL BIOPSY RIGHT Right         OTHER SURGICAL HISTORY      growth removed under arm    CT REPAIR OF NASAL SEPTUM Bilateral 2018    IGS ENDOSCOPIC POSSIBLE FRONTAL BILATERAL, BILATERAL MAXILLARY ANTROSTOMY, BILATERAL ETHMOIDECTOMIES, SEPTOPLASTY, SUBMUCOUS RESECT TTURBINATES (SMR) PARTIAL performed by Leslie Garcia MD at [address] [place] [address]      brain         Family History   Problem Relation Age of Onset    Bipolar Disorder Mother     Hypertension Mother     Schizophrenia Mother     Mental Illness Mother     High Blood Pressure Mother     High Cholesterol Mother     Heart Disease Father     Breast Cancer Sister     Breast Cancer Maternal Grandmother     Pancreatic Cancer Paternal Grandmother     Colon Cancer Paternal Uncle     Prostate Cancer Paternal Uncle     Asthma Neg Hx     Birth Defects Neg Hx     Depression Neg Hx     Diabetes Neg Hx     Early Death Neg Hx     Hearing Loss Neg Hx        Social History     Tobacco Use    Smoking status: Never    Smokeless tobacco: Never   Vaping Use    Vaping Use: Never used   Substance Use Topics    Alcohol use: No    Drug use: No       Objective   /74   Pulse 87   Temp 97.5 °F (36.4 °C)   Resp 16   Ht 5' 7\" (1.702 m)   Wt 159 lb (72.1 kg)   SpO2 95%   BMI 24.90 kg/m²   Wt Readings from Last 3 Encounters:   09/27/22 159 lb (72.1 kg)   09/03/22 155 lb (70.3 kg)   06/21/22 157 lb 6.4 oz (71.4 kg)     There were no vitals filed for this visit. Physical Exam  Vitals and nursing note reviewed. Constitutional:       Appearance: She is not ill-appearing. HENT:      Right Ear: Tympanic membrane, ear canal and external ear normal.      Left Ear: Tympanic membrane, ear canal and external ear normal.      Nose: Nose normal.      Mouth/Throat:      Mouth: Mucous membranes are moist.   Eyes:      Pupils: Pupils are equal, round, and reactive to light. Neck:      Thyroid: Thyromegaly present. No thyroid mass or thyroid tenderness. Comments: Neck fullness palpated the area is soft and nontender, no nodules palpated   Cardiovascular:      Rate and Rhythm: Normal rate and regular rhythm. Pulses: Normal pulses. Heart sounds: Normal heart sounds. No murmur heard. Pulmonary:      Effort: Pulmonary effort is normal. No respiratory distress. Breath sounds: Normal breath sounds. No wheezing. Abdominal:      General: Abdomen is flat. Bowel sounds are normal. There is no distension. Palpations: Abdomen is soft. Tenderness: There is no abdominal tenderness. Musculoskeletal:         General: Normal range of motion. Skin:     General: Skin is warm and dry. Capillary Refill: Capillary refill takes less than 2 seconds. Neurological:      General: No focal deficit present. Mental Status: She is alert and oriented to person, place, and time. Psychiatric:         Mood and Affect: Mood normal.         Behavior: Behavior normal.         Thought Content: Thought content normal.         Judgment: Judgment normal.         Assessment   Plan   Encounter Diagnoses   Name Primary?     Encounter for well adult exam without abnormal findings Yes    Non-seasonal allergic rhinitis due to other allergic trigger     Chronic rhinitis     Nasal congestion     Depression screening negative     Neck fullness         Orders Placed This Encounter   Procedures    US THYROID    Zoster, SHINGRIX, (50 yrs +), IM    TSH With Reflex Ft4    Rhinitis stable    Thyroid enlargement r/o thyromegaly or thyroid nodules.  , repeat TSH      Personalized Preventive Plan   Current Health Maintenance Status  Immunization History   Administered Date(s) Administered    COVID-19, PFIZER PURPLE top, DILUTE for use, (age 15 y+), 30mcg/0.3mL 03/29/2021, 04/19/2021, 01/03/2022    Influenza Virus Vaccine 12/10/2010, 01/05/2015, 10/04/2017, 10/02/2018, 10/22/2019, 10/19/2020    Influenza, High Dose (Fluzone 65 yrs and older) 10/14/2011    Tdap (Boostrix, Adacel) 01/14/2021    Zoster Recombinant (Shingrix) 09/27/2022        Health Maintenance   Topic Date Due    COVID-19 Vaccine (4 - Booster for Pfizer series) 06/04/2022    Flu vaccine (1) 08/01/2022    Breast cancer screen  10/25/2022    Shingles vaccine (2 of 2) 11/22/2022    Depression Screen  03/01/2023    Lipids  05/12/2027    DTaP/Tdap/Td vaccine (2 - Td or Tdap) 01/14/2031    Colorectal Cancer Screen  04/22/2032    Hepatitis A vaccine  Aged Out    Hepatitis B vaccine  Aged Out    Hib vaccine  Aged Out    Meningococcal (ACWY) vaccine  Aged Out    Pneumococcal 0-64 years Vaccine  Aged Out    Hepatitis C screen  Discontinued    HIV screen  Discontinued     Recommendations for Preventive Services Due: see orders and patient instructions/AVS.    Return in about 1 year (around 9/27/2023) for physical exam .

## 2022-09-27 NOTE — PROGRESS NOTES
Immunization(s) given during visit:    Immunizations Administered       Name Date Dose Route    Zoster Recombinant (Shingrix) 9/27/2022 0.5 mL Intramuscular    Site: Deltoid- Left    Lot: [id number]    NDC: 57634-633-34            Most recent Vaccine Information Sheet dated 10/31/19 given to ilda

## 2022-09-27 NOTE — PATIENT INSTRUCTIONS
Well Visit, Women 48 to 72: Care Instructions  Overview     Well visits can help you stay healthy. Your doctor has checked your overall health and may have suggested ways to take good care of yourself. Your doctor also may have recommended tests. At home, you can help prevent illness with healthy eating, regular exercise, and other steps. Follow-up care is a key part of your treatment and safety. Be sure to make and go to all appointments, and call your doctor if you are having problems. It's also a good idea to know your test results and keep a list of the medicines you take. How can you care for yourself at home? Get screening tests that you and your doctor decide on. Screening helps find diseases before any symptoms appear. Eat healthy foods. Choose fruits, vegetables, whole grains, protein, and low-fat dairy foods. Limit fat, especially saturated fat. Reduce salt in your diet. Limit alcohol. Have no more than 1 drink a day or 7 drinks a week. Get at least 30 minutes of exercise on most days of the week. Walking is a good choice. You also may want to do other activities, such as running, swimming, cycling, or playing tennis or team sports. Reach and stay at a healthy weight. This will lower your risk for many problems, such as obesity, diabetes, heart disease, and high blood pressure. Do not smoke. Smoking can make health problems worse. If you need help quitting, talk to your doctor about stop-smoking programs and medicines. These can increase your chances of quitting for good. Care for your mental health. It is easy to get weighed down by worry and stress. Learn strategies to manage stress, like deep breathing and mindfulness, and stay connected with your family and community. If you find you often feel sad or hopeless, talk with your doctor. Treatment can help. Talk to your doctor about whether you have any risk factors for sexually transmitted infections (STIs).  You can help prevent STIs if you wait to have sex with a new partner (or partners) until you've each been tested for STIs. It also helps if you use condoms (male or female condoms) and if you limit your sex partners to one person who only has sex with you. Vaccines are available for some STIs. If you think you may have a problem with alcohol or drug use, talk to your doctor. This includes prescription medicines (such as amphetamines and opioids) and illegal drugs (such as cocaine and methamphetamine). Your doctor can help you figure out what type of treatment is best for you. Protect your skin from too much sun. When you're outdoors from 10 a.m. to 4 p.m., stay in the shade or cover up with clothing and a hat with a wide brim. Wear sunglasses that block UV rays. Even when it's cloudy, put broad-spectrum sunscreen (SPF 30 or higher) on any exposed skin. See a dentist one or two times a year for checkups and to have your teeth cleaned. Wear a seat belt in the car. When should you call for help? Watch closely for changes in your health, and be sure to contact your doctor if you have any problems or symptoms that concern you. Where can you learn more? Go to https://"Hey, Neighbor!"peJust Above Costeb.[facility]. org and sign in to your [employer] account. Enter F336 in the [facility] box to learn more about \"Well Visit, Women 50 to 72: Care Instructions. \"     If you do not have an account, please click on the \"Sign Up Now\" link. Current as of: June 6, 2022               Content Version: 13.4  © 2006-2022 Healthwise, Incorporated. Care instructions adapted under license by [facility] [place] ([facility]). If you have questions about a medical condition or this instruction, always ask your healthcare professional. [place] [address] any warranty or liability for your use of this information.

## 2022-10-27 ENCOUNTER — HOSPITAL ENCOUNTER (OUTPATIENT)
Age: 52
Discharge: HOME | End: 2022-10-27
Payer: COMMERCIAL

## 2022-10-27 ENCOUNTER — HOSPITAL ENCOUNTER (OUTPATIENT)
Dept: ULTRASOUND IMAGING | Age: 52
Discharge: HOME | End: 2022-10-27
Payer: COMMERCIAL

## 2022-10-27 DIAGNOSIS — R22.1 NECK FULLNESS: ICD-10-CM

## 2022-10-27 DIAGNOSIS — E04.2 MULTINODULAR GOITER: Primary | ICD-10-CM

## 2022-10-27 LAB — TSH SERPL DL<=0.05 MIU/L-ACNC: 2.02 UIU/ML (ref 0.4–4.2)

## 2022-10-27 PROCEDURE — 84443 ASSAY THYROID STIM HORMONE: CPT

## 2022-10-27 PROCEDURE — 76536 US EXAM OF HEAD AND NECK: CPT

## 2022-10-27 PROCEDURE — 36415 COLL VENOUS BLD VENIPUNCTURE: CPT

## 2022-10-28 ENCOUNTER — TELEPHONE (OUTPATIENT)
Dept: FAMILY MEDICINE CLINIC | Age: 52
End: 2022-10-28

## 2022-10-28 NOTE — TELEPHONE ENCOUNTER
Patient informed and verbalized understanding. No questions at this time.  Talked to Josh from IR and let her know I was faxing needle biopsy order over to given fax number [phone]

## 2022-10-28 NOTE — TELEPHONE ENCOUNTER
----- Message from Mira Allan DO sent at 10/27/2022  9:38 PM EDT -----  Please pt know that TSH is WNL  Let me know if questions, thanks!

## 2022-10-28 NOTE — TELEPHONE ENCOUNTER
----- Message from BLAIR Camacho CNP sent at 10/27/2022  2:18 PM EDT -----  Let Missy Gan know her thyroid lab is normal. Her thyroid US, however, shows a nodule on the thyroid which is concerning. Radiologist is recommending doing a biopsy of it. I have placed orders for the biopsy.

## 2022-11-07 ENCOUNTER — HOSPITAL ENCOUNTER (OUTPATIENT)
Dept: ULTRASOUND IMAGING | Age: 52
Discharge: HOME | End: 2022-11-07
Payer: COMMERCIAL

## 2022-11-07 DIAGNOSIS — E04.1 RIGHT THYROID NODULE: ICD-10-CM

## 2022-11-07 PROCEDURE — 88177 CYTP FNA EVAL EA ADDL: CPT

## 2022-11-07 PROCEDURE — 76942 ECHO GUIDE FOR BIOPSY: CPT

## 2022-11-07 PROCEDURE — 88172 CYTP DX EVAL FNA 1ST EA SITE: CPT

## 2022-11-07 PROCEDURE — 88173 CYTOPATH EVAL FNA REPORT: CPT

## 2022-11-09 ENCOUNTER — TELEPHONE (OUTPATIENT)
Dept: FAMILY MEDICINE CLINIC | Age: 52
End: 2022-11-09

## 2022-11-09 NOTE — TELEPHONE ENCOUNTER
----- Message from BLAIR Sprague CNP sent at 11/9/2022 10:55 AM EST -----  Let Janelle Salazar know her biopsy of the thyroid nodule is benign.

## 2023-06-08 LAB
CHOLEST SERPL-MCNC: 205 MG/DL (ref 0–199)
FASTING: YES
GLUCOSE SERPL-MCNC: 88 MG/DL (ref 74–109)
HDLC SERPL-MCNC: 61 MG/DL (ref 40–90)
LDLC SERPL CALC-MCNC: 119 MG/DL
TRIGL SERPL-MCNC: 123 MG/DL (ref 0–199)

## 2023-08-21 ENCOUNTER — OFFICE VISIT (OUTPATIENT)
Dept: FAMILY MEDICINE CLINIC | Age: 53
End: 2023-08-21
Payer: COMMERCIAL

## 2023-08-21 ENCOUNTER — TELEPHONE (OUTPATIENT)
Dept: FAMILY MEDICINE CLINIC | Age: 53
End: 2023-08-21

## 2023-08-21 VITALS
SYSTOLIC BLOOD PRESSURE: 126 MMHG | BODY MASS INDEX: 24.58 KG/M2 | RESPIRATION RATE: 16 BRPM | OXYGEN SATURATION: 97 % | TEMPERATURE: 98 F | HEART RATE: 92 BPM | DIASTOLIC BLOOD PRESSURE: 78 MMHG | WEIGHT: 156.6 LBS | HEIGHT: 67 IN

## 2023-08-21 DIAGNOSIS — R09.81 NASAL CONGESTION: ICD-10-CM

## 2023-08-21 DIAGNOSIS — R05.1 ACUTE COUGH: ICD-10-CM

## 2023-08-21 DIAGNOSIS — R51.9 ACUTE NONINTRACTABLE HEADACHE, UNSPECIFIED HEADACHE TYPE: ICD-10-CM

## 2023-08-21 DIAGNOSIS — J01.00 ACUTE NON-RECURRENT MAXILLARY SINUSITIS: Primary | ICD-10-CM

## 2023-08-21 LAB
Lab: 1234
QC PASS/FAIL: NORMAL
SARS-COV-2 RDRP RESP QL NAA+PROBE: NEGATIVE

## 2023-08-21 PROCEDURE — 87635 SARS-COV-2 COVID-19 AMP PRB: CPT | Performed by: NURSE PRACTITIONER

## 2023-08-21 PROCEDURE — 99213 OFFICE O/P EST LOW 20 MIN: CPT | Performed by: NURSE PRACTITIONER

## 2023-08-21 RX ORDER — AMOXICILLIN AND CLAVULANATE POTASSIUM 875; 125 MG/1; MG/1
1 TABLET, FILM COATED ORAL 2 TIMES DAILY
Qty: 20 TABLET | Refills: 0 | Status: SHIPPED | OUTPATIENT
Start: 2023-08-21 | End: 2023-08-31

## 2023-08-21 RX ORDER — FLUTICASONE PROPIONATE 50 MCG
1 SPRAY, SUSPENSION (ML) NASAL DAILY
Qty: 32 G | Refills: 1 | Status: SHIPPED | OUTPATIENT
Start: 2023-08-21

## 2023-08-21 RX ORDER — HYDROCODONE POLISTIREX AND CHLORPHENIRAMINE POLISTIREX 10; 8 MG/5ML; MG/5ML
5 SUSPENSION, EXTENDED RELEASE ORAL EVERY 12 HOURS PRN
Qty: 50 ML | Refills: 0 | Status: SHIPPED | OUTPATIENT
Start: 2023-08-21 | End: 2023-08-21 | Stop reason: SDUPTHER

## 2023-08-21 RX ORDER — GUAIFENESIN 600 MG/1
600 TABLET, EXTENDED RELEASE ORAL 2 TIMES DAILY
Qty: 30 TABLET | Refills: 0 | Status: SHIPPED | OUTPATIENT
Start: 2023-08-21 | End: 2023-09-05

## 2023-08-21 RX ORDER — HYDROCODONE POLISTIREX AND CHLORPHENIRAMINE POLISTIREX 10; 8 MG/5ML; MG/5ML
5 SUSPENSION, EXTENDED RELEASE ORAL EVERY 12 HOURS PRN
Qty: 50 ML | Refills: 0 | Status: SHIPPED | OUTPATIENT
Start: 2023-08-21 | End: 2023-08-26

## 2023-08-21 SDOH — ECONOMIC STABILITY: HOUSING INSECURITY
IN THE LAST 12 MONTHS, WAS THERE A TIME WHEN YOU DID NOT HAVE A STEADY PLACE TO SLEEP OR SLEPT IN A SHELTER (INCLUDING NOW)?: NO

## 2023-08-21 SDOH — ECONOMIC STABILITY: INCOME INSECURITY: HOW HARD IS IT FOR YOU TO PAY FOR THE VERY BASICS LIKE FOOD, HOUSING, MEDICAL CARE, AND HEATING?: NOT HARD AT ALL

## 2023-08-21 SDOH — ECONOMIC STABILITY: FOOD INSECURITY: WITHIN THE PAST 12 MONTHS, YOU WORRIED THAT YOUR FOOD WOULD RUN OUT BEFORE YOU GOT MONEY TO BUY MORE.: NEVER TRUE

## 2023-08-21 SDOH — ECONOMIC STABILITY: FOOD INSECURITY: WITHIN THE PAST 12 MONTHS, THE FOOD YOU BOUGHT JUST DIDN'T LAST AND YOU DIDN'T HAVE MONEY TO GET MORE.: NEVER TRUE

## 2023-08-21 NOTE — TELEPHONE ENCOUNTER
Pt requesting RX for cough med HYDROcodone-chlorpheniramine (TUSSIONEX PENNKINETIC ER) 10-8 MG/5ML SUER    be sent to [address] [place] [address]  is out of stock

## 2023-08-21 NOTE — PROGRESS NOTES
SUBJECTIVE:  Carmen Whitfield is a 48 y.o. y/o female that presents with Follow-up (Sinus headache/congestion  for about a week and a half, getting worse )  . HPI:      Symptoms have been present for 1 week(s). Pt has had maxillary sinus pain and pressure for 1 week but yesterday woke up with headache, chest congestion and coughing and nasal congestion  Symptoms are worse since they initially started. Fever? No  Runny nose or congestion? Yes   Cough? Yes  Sore throat? Yes  Headache, fatigue, joint pains, muscle aches? No  Shortness of breath/Wheezing? No  Nausea/Vomiting/Diarrhea? No  Double Sickening? No  Sick contacts? No  Known COVID exposures of RFs? No home covid test yesterday was negative,   Smoker? No  Preexisting Respiratory conditions? No    Patient has tried cough medicine otc sinex  with out improvement. Symptoms are getting worse, can't sleep coughing so much    Past Medical History:   Diagnosis Date    Headache        Social History     Socioeconomic History    Marital status:      Spouse name: Not on file    Number of children: Not on file    Years of education: Not on file    Highest education level: Not on file   Occupational History    Not on file   Tobacco Use    Smoking status: Never    Smokeless tobacco: Never   Vaping Use    Vaping Use: Never used   Substance and Sexual Activity    Alcohol use: No    Drug use: No    Sexual activity: Yes     Partners: Male   Other Topics Concern    Not on file   Social History Narrative    Not on file     Social Determinants of Health     Financial Resource Strain: Low Risk     Difficulty of Paying Living Expenses: Not hard at all   Food Insecurity: No Food Insecurity    Worried About Running Out of Food in the Last Year: Never true    [address] in the Last Year: Never true   Transportation Needs: Unknown    Lack of Transportation (Medical): Not on file    Lack of Transportation (Non-Medical):  No   Physical Activity: Not on

## 2023-09-14 ENCOUNTER — HOSPITAL ENCOUNTER (EMERGENCY)
Age: 53
Discharge: HOME | End: 2023-09-14
Payer: COMMERCIAL

## 2023-09-14 VITALS
TEMPERATURE: 97.4 F | HEART RATE: 82 BPM | DIASTOLIC BLOOD PRESSURE: 99 MMHG | HEIGHT: 67 IN | OXYGEN SATURATION: 97 % | SYSTOLIC BLOOD PRESSURE: 139 MMHG | WEIGHT: 158 LBS | BODY MASS INDEX: 24.8 KG/M2 | RESPIRATION RATE: 16 BRPM

## 2023-09-14 DIAGNOSIS — J01.90 ACUTE VIRAL SINUSITIS: Primary | ICD-10-CM

## 2023-09-14 DIAGNOSIS — B97.89 ACUTE VIRAL SINUSITIS: Primary | ICD-10-CM

## 2023-09-14 PROCEDURE — 99213 OFFICE O/P EST LOW 20 MIN: CPT | Performed by: NURSE PRACTITIONER

## 2023-09-14 PROCEDURE — 99213 OFFICE O/P EST LOW 20 MIN: CPT

## 2023-09-14 RX ORDER — PREDNISONE 20 MG/1
20 TABLET ORAL 2 TIMES DAILY
Qty: 10 TABLET | Refills: 0 | Status: SHIPPED | OUTPATIENT
Start: 2023-09-14 | End: 2023-09-19

## 2023-09-14 RX ORDER — BENZONATATE 200 MG/1
200 CAPSULE ORAL 3 TIMES DAILY PRN
Qty: 21 CAPSULE | Refills: 0 | Status: SHIPPED | OUTPATIENT
Start: 2023-09-14 | End: 2023-09-21

## 2023-09-14 ASSESSMENT — PAIN - FUNCTIONAL ASSESSMENT
PAIN_FUNCTIONAL_ASSESSMENT: PREVENTS OR INTERFERES SOME ACTIVE ACTIVITIES AND ADLS
PAIN_FUNCTIONAL_ASSESSMENT: 0-10

## 2023-09-14 ASSESSMENT — ENCOUNTER SYMPTOMS
WHEEZING: 0
SINUS PRESSURE: 1
BACK PAIN: 0
CHEST TIGHTNESS: 0
APNEA: 0
CHOKING: 0
SINUS PAIN: 0
RHINORRHEA: 1
STRIDOR: 0
FACIAL SWELLING: 0
SHORTNESS OF BREATH: 0
SWOLLEN GLANDS: 0
SORE THROAT: 1
COUGH: 1
TROUBLE SWALLOWING: 0

## 2023-09-14 ASSESSMENT — PAIN DESCRIPTION - DESCRIPTORS: DESCRIPTORS: ACHING

## 2023-09-14 ASSESSMENT — PAIN SCALES - GENERAL: PAINLEVEL_OUTOF10: 3

## 2023-09-14 ASSESSMENT — PAIN DESCRIPTION - LOCATION: LOCATION: EAR

## 2023-09-14 ASSESSMENT — PAIN DESCRIPTION - ORIENTATION: ORIENTATION: RIGHT;LEFT

## 2023-09-14 ASSESSMENT — PAIN DESCRIPTION - PAIN TYPE: TYPE: ACUTE PAIN

## 2023-09-14 NOTE — ED PROVIDER NOTES
[facility]  Urgent Care Encounter      CHIEF COMPLAINT       Chief Complaint   Patient presents with    Cough    Otalgia       Nurses Notes reviewed and I agree except as noted in the HPI. HISTORY OFPRESENT ILLNESS   Aide Covington is a 48 y.o. The history is provided by the patient. No  was used. URI  Presenting symptoms: congestion, cough, ear pain, rhinorrhea and sore throat    Presenting symptoms: no facial pain, no fatigue and no fever    Congestion:     Location:  Nasal    Interferes with sleep: no      Interferes with eating/drinking: no    Cough:     Cough characteristics:  Productive    Sputum characteristics:  Nondescript    Severity:  Moderate    Onset quality:  Gradual    Duration:  1 month    Timing:  Intermittent    Progression:  Unchanged    Chronicity:  New  Ear pain:     Location:  Bilateral    Severity:  Mild    Onset quality:  Sudden    Duration:  2 days    Timing:  Intermittent    Progression:  Worsening    Chronicity:  New  Rhinorrhea:     Quality:  Clear    Severity:  Mild    Duration:  2 days    Timing:  Intermittent    Progression:  Worsening  Severity:  Mild  Onset quality:  Sudden  Duration:  2 days  Timing:  Intermittent  Progression:  Worsening  Chronicity:  New  Relieved by:  Nothing  Worsened by:  Nothing  Ineffective treatments:  OTC medications  Associated symptoms: headaches    Associated symptoms: no arthralgias, no myalgias, no neck pain, no sinus pain, no sneezing, no swollen glands and no wheezing    Risk factors: sick contacts    Risk factors: not elderly, no chronic cardiac disease, no chronic kidney disease, no chronic respiratory disease, no diabetes mellitus, no immunosuppression, no recent illness and no recent travel        REVIEW OF SYSTEMS     Review of Systems   Constitutional:  Negative for activity change, appetite change, chills, diaphoresis, fatigue, fever and unexpected weight change.    HENT:  Positive for Claudine Black  09/14/23 [id number]

## 2023-09-14 NOTE — ED PROVIDER NOTES
nasal decongestant as directed  Monitor for any fever increased and sinus pain or pressure  Follow-up see her primary care provider in 48 hours  Or go to the emergency department for any changes or concerns.        REFERRED TO:  BLAIR Cardenas CNP  [address]  [phone]    Schedule an appointment as soon as possible for a visit       DISCHARGE MEDICATIONS:  Discharge Medication List as of 9/14/2023 12:11 PM        START taking these medications    Details   predniSONE (DELTASONE) 20 MG tablet Take 1 tablet by mouth 2 times daily for 5 days, Disp-10 tablet, R-0Normal      benzonatate (TESSALON) 200 MG capsule Take 1 capsule by mouth 3 times daily as needed for Cough, Disp-21 capsule, R-0Normal           Discharge Medication List as of 9/14/2023 12:11 PM          Mutual Handing, APRN - CNP          Mutual Handing, APRN - CNP  09/14/23 1909

## 2023-09-14 NOTE — DISCHARGE INSTRUCTIONS
Drink lots of fluids  Take Motrin or Tylenol for headache  Humidification of air  Use nasal spray as directed  Monitor for any fever increased and sinus pain or pressure  Follow-up see her primary care provider in 48 hours  Or go to the emergency department for any changes or concerns.

## 2023-09-14 NOTE — ED TRIAGE NOTES
Patient ambulated to room and states she has had a cough for a month but yesterday began having ear pain as well

## 2023-09-18 ENCOUNTER — TELEPHONE (OUTPATIENT)
Dept: FAMILY MEDICINE CLINIC | Age: 53
End: 2023-09-18

## 2023-09-18 NOTE — TELEPHONE ENCOUNTER
Patient came to the office today stating she was seen at urgent care on 09/14/23 and was treated for a sinus infection. While there her spouse tested positive for COVID she states that she was told to follow his restrictions and he was just cleared today to return to work tomorrow. Her note states that she was to RTW on 09/16/23 but she was to follow his restrictions. She would like a note to RTW 09/19/2023.

## 2023-09-18 NOTE — TELEPHONE ENCOUNTER
Left detailed message that letter is written and up at . Okay per HIPAA.  Requested call back if any further questions

## 2023-09-18 NOTE — TELEPHONE ENCOUNTER
Let pt know I wrote a RTW letter for her to RTW on September 19th, 2023.  It is in basket by printer

## 2023-09-28 ENCOUNTER — OFFICE VISIT (OUTPATIENT)
Dept: FAMILY MEDICINE CLINIC | Age: 53
End: 2023-09-28
Payer: COMMERCIAL

## 2023-09-28 VITALS
HEIGHT: 67 IN | BODY MASS INDEX: 25.11 KG/M2 | OXYGEN SATURATION: 98 % | HEART RATE: 100 BPM | SYSTOLIC BLOOD PRESSURE: 104 MMHG | DIASTOLIC BLOOD PRESSURE: 78 MMHG | WEIGHT: 160 LBS | RESPIRATION RATE: 20 BRPM | TEMPERATURE: 97.3 F

## 2023-09-28 DIAGNOSIS — J30.89 NON-SEASONAL ALLERGIC RHINITIS DUE TO OTHER ALLERGIC TRIGGER: ICD-10-CM

## 2023-09-28 DIAGNOSIS — R05.1 ACUTE COUGH: ICD-10-CM

## 2023-09-28 DIAGNOSIS — Z13.31 DEPRESSION SCREENING NEGATIVE: ICD-10-CM

## 2023-09-28 DIAGNOSIS — Z00.00 ENCOUNTER FOR WELL ADULT EXAM WITHOUT ABNORMAL FINDINGS: Primary | ICD-10-CM

## 2023-09-28 DIAGNOSIS — Z12.31 ENCOUNTER FOR SCREENING MAMMOGRAM FOR BREAST CANCER: ICD-10-CM

## 2023-09-28 PROBLEM — J32.1 CHRONIC FRONTAL SINUSITIS: Status: RESOLVED | Noted: 2018-04-12 | Resolved: 2023-09-28

## 2023-09-28 PROCEDURE — 99213 OFFICE O/P EST LOW 20 MIN: CPT | Performed by: NURSE PRACTITIONER

## 2023-09-28 PROCEDURE — 99396 PREV VISIT EST AGE 40-64: CPT | Performed by: NURSE PRACTITIONER

## 2023-09-28 RX ORDER — BENZONATATE 100 MG/1
100-200 CAPSULE ORAL 3 TIMES DAILY PRN
Qty: 60 CAPSULE | Refills: 0 | Status: SHIPPED | OUTPATIENT
Start: 2023-09-28 | End: 2023-10-05

## 2023-09-28 ASSESSMENT — ENCOUNTER SYMPTOMS
COUGH: 1
CHOKING: 0
SHORTNESS OF BREATH: 0
WHEEZING: 0
CHEST TIGHTNESS: 0
GASTROINTESTINAL NEGATIVE: 1
RHINORRHEA: 1

## 2023-09-28 NOTE — PROGRESS NOTES
Well Adult Note  Name: Everrett Cushing Date: 2023   MRN: [id number] Sex: Female   Age: 48 y.o. Ethnicity: Non- / Non    : 1970 Race: Ximena Kirby / Vilma Guadarrama is here for well adult exam.  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 Denies depression or anxiety  Uses flonase an xyzal to control allergies, works well. States she has had a cough for the last several weeks, dry cough no hemoptysis    Colonoscopy   Due for pelvic exam f/u with gyn  Mammogram ordered     Review of Systems   Constitutional:  Negative for chills, fatigue and fever. HENT:  Positive for postnasal drip and rhinorrhea. Respiratory:  Positive for cough. Negative for choking, chest tightness, shortness of breath and wheezing. Cardiovascular: Negative. Gastrointestinal: Negative. Endocrine: Negative for polydipsia, polyphagia and polyuria. Genitourinary: Negative. Musculoskeletal: Negative. Skin: Negative. Allergic/Immunologic: Positive for environmental allergies. Neurological: Negative. Psychiatric/Behavioral:  Negative for self-injury, sleep disturbance and suicidal ideas. Allergies   Allergen Reactions    Seasonal          Prior to Visit Medications    Medication Sig Taking?  Authorizing Provider   benzonatate (TESSALON) 100 MG capsule Take 1-2 capsules by mouth 3 times daily as needed for Cough Yes BLAIR Puente CNP   triamcinolone (NASACORT) 55 MCG/ACT nasal inhaler 2 sprays by Each Nostril route daily Yes Jadyn Davila DO   levocetirizine (XYZAL) 5 MG tablet Take 1 tablet by mouth nightly Yes Jadyn Davila DO   fluticasone (FLONASE) 50 MCG/ACT nasal spray 1 spray by Each Nostril route daily  Patient not taking: Reported on 2023  BLAIR Puente CNP         Past Medical History:   Diagnosis Date    Headache        Past Surgical

## 2023-10-02 ENCOUNTER — TELEPHONE (OUTPATIENT)
Dept: FAMILY MEDICINE CLINIC | Age: 53
End: 2023-10-02

## 2023-10-02 ENCOUNTER — NURSE ONLY (OUTPATIENT)
Dept: LAB | Age: 53
End: 2023-10-02

## 2023-10-02 ENCOUNTER — HOSPITAL ENCOUNTER (OUTPATIENT)
Dept: GENERAL RADIOLOGY | Age: 53
Discharge: HOME | End: 2023-10-02
Payer: COMMERCIAL

## 2023-10-02 ENCOUNTER — HOSPITAL ENCOUNTER (OUTPATIENT)
Age: 53
Discharge: HOME | End: 2023-10-02
Payer: COMMERCIAL

## 2023-10-02 DIAGNOSIS — R05.1 ACUTE COUGH: ICD-10-CM

## 2023-10-02 DIAGNOSIS — Z00.00 ENCOUNTER FOR WELL ADULT EXAM WITHOUT ABNORMAL FINDINGS: ICD-10-CM

## 2023-10-02 LAB
ANION GAP SERPL CALC-SCNC: 12 MEQ/L (ref 8–16)
BASOPHILS ABSOLUTE: 0.1 THOU/MM3 (ref 0–0.1)
BASOPHILS NFR BLD AUTO: 0.8 %
BUN SERPL-MCNC: 14 MG/DL (ref 7–22)
CALCIUM SERPL-MCNC: 9.6 MG/DL (ref 8.5–10.5)
CHLORIDE SERPL-SCNC: 104 MEQ/L (ref 98–111)
CO2 SERPL-SCNC: 25 MEQ/L (ref 23–33)
CREAT SERPL-MCNC: 0.6 MG/DL (ref 0.4–1.2)
DEPRECATED RDW RBC AUTO: 46.9 FL (ref 35–45)
EOSINOPHIL NFR BLD AUTO: 5.1 %
EOSINOPHILS ABSOLUTE: 0.4 THOU/MM3 (ref 0–0.4)
ERYTHROCYTE [DISTWIDTH] IN BLOOD BY AUTOMATED COUNT: 14.4 % (ref 11.5–14.5)
GFR SERPL CREATININE-BSD FRML MDRD: > 60 ML/MIN/1.73M2
GLUCOSE SERPL-MCNC: 93 MG/DL (ref 70–108)
HCT VFR BLD AUTO: 40.6 % (ref 37–47)
HGB BLD-MCNC: 13.2 GM/DL (ref 12–16)
IMM GRANULOCYTES # BLD AUTO: 0.03 THOU/MM3 (ref 0–0.07)
IMM GRANULOCYTES NFR BLD AUTO: 0.4 %
LYMPHOCYTES ABSOLUTE: 2.3 THOU/MM3 (ref 1–4.8)
LYMPHOCYTES NFR BLD AUTO: 31.4 %
MCH RBC QN AUTO: 29.3 PG (ref 26–33)
MCHC RBC AUTO-ENTMCNC: 32.5 GM/DL (ref 32.2–35.5)
MCV RBC AUTO: 90 FL (ref 81–99)
MONOCYTES ABSOLUTE: 0.7 THOU/MM3 (ref 0.4–1.3)
MONOCYTES NFR BLD AUTO: 9.9 %
NEUTROPHILS NFR BLD AUTO: 52.4 %
NRBC BLD AUTO-RTO: 0 /100 WBC
PLATELET # BLD AUTO: 331 THOU/MM3 (ref 130–400)
PMV BLD AUTO: 9.2 FL (ref 9.4–12.4)
POTASSIUM SERPL-SCNC: 4.2 MEQ/L (ref 3.5–5.2)
RBC # BLD AUTO: 4.51 MILL/MM3 (ref 4.2–5.4)
SEGMENTED NEUTROPHILS ABSOLUTE COUNT: 3.8 THOU/MM3 (ref 1.8–7.7)
SODIUM SERPL-SCNC: 141 MEQ/L (ref 135–145)
TSH SERPL DL<=0.005 MIU/L-ACNC: 1.02 UIU/ML (ref 0.4–4.2)
WBC # BLD AUTO: 7.2 THOU/MM3 (ref 4.8–10.8)

## 2023-10-02 PROCEDURE — 71046 X-RAY EXAM CHEST 2 VIEWS: CPT

## 2023-10-02 NOTE — TELEPHONE ENCOUNTER
----- Message from BLAIR Sparks CNP sent at 10/2/2023  1:29 PM EDT -----  Let pt know her chest x-ray Is negative for pneumonia or extra fluid,  continue with current plan  ask if cough better

## 2023-10-02 NOTE — TELEPHONE ENCOUNTER
----- Message from BLAIR Pradhan CNP sent at 10/2/2023  4:38 PM EDT -----  Let pt know her CBC is  stable , no anemia BMP and TSH still pending will get back with her when those results are in.

## 2023-10-02 NOTE — TELEPHONE ENCOUNTER
Patient informed and verbalized understanding. States cough is a little better, still comes and goes.   The medication for the cough is helping

## 2023-10-03 ENCOUNTER — TELEPHONE (OUTPATIENT)
Dept: FAMILY MEDICINE CLINIC | Age: 53
End: 2023-10-03

## 2023-10-03 NOTE — TELEPHONE ENCOUNTER
Called patient and informed of message. Patient verbalized understanding. No questions or concerns at this time.

## 2023-10-03 NOTE — TELEPHONE ENCOUNTER
----- Message from BLAIR Pedro CNP sent at 10/3/2023  9:46 AM EDT -----  Let pt know rest of labs are back and in normal range,

## 2024-04-12 DIAGNOSIS — J31.0 CHRONIC RHINITIS: ICD-10-CM

## 2024-04-12 DIAGNOSIS — R09.81 NASAL CONGESTION: ICD-10-CM

## 2024-04-12 RX ORDER — LEVOCETIRIZINE DIHYDROCHLORIDE 5 MG/1
5 TABLET, FILM COATED ORAL NIGHTLY
Qty: 90 TABLET | Refills: 3 | Status: SHIPPED | OUTPATIENT
Start: 2024-04-12

## 2024-04-12 NOTE — TELEPHONE ENCOUNTER
Recent Visits  Date Type Provider Dept   09/28/23 Office Visit Steve Hay APRN - CNP [facility]   08/21/23 Office Visit Steve Hay APRN - CNP [facility]   Showing recent visits within past 540 days with a meds authorizing provider and meeting all other requirements  Future Appointments  No visits were found meeting these conditions.  Showing future appointments within next 150 days with a meds authorizing provider and meeting all other requirements

## 2024-06-05 ENCOUNTER — HOSPITAL ENCOUNTER (OUTPATIENT)
Age: 54
Discharge: HOME | End: 2024-06-05
Payer: COMMERCIAL

## 2024-06-05 ENCOUNTER — HOSPITAL ENCOUNTER (OUTPATIENT)
Dept: GENERAL RADIOLOGY | Age: 54
Discharge: HOME | End: 2024-06-05
Payer: COMMERCIAL

## 2024-06-05 ENCOUNTER — OFFICE VISIT (OUTPATIENT)
Dept: FAMILY MEDICINE CLINIC | Age: 54
End: 2024-06-05
Payer: COMMERCIAL

## 2024-06-05 VITALS
SYSTOLIC BLOOD PRESSURE: 128 MMHG | BODY MASS INDEX: 25.52 KG/M2 | DIASTOLIC BLOOD PRESSURE: 84 MMHG | WEIGHT: 162.6 LBS | HEIGHT: 67 IN | OXYGEN SATURATION: 98 % | TEMPERATURE: 98.2 F | HEART RATE: 88 BPM | RESPIRATION RATE: 16 BRPM

## 2024-06-05 DIAGNOSIS — M25.819 SHOULDER IMPINGEMENT: ICD-10-CM

## 2024-06-05 DIAGNOSIS — M54.12 ACUTE CERVICAL RADICULOPATHY: ICD-10-CM

## 2024-06-05 DIAGNOSIS — M62.81 MUSCLE WEAKNESS OF LEFT UPPER EXTREMITY: ICD-10-CM

## 2024-06-05 DIAGNOSIS — M79.602 PAIN IN LEFT ARM: ICD-10-CM

## 2024-06-05 DIAGNOSIS — M79.602 PAIN IN LEFT ARM: Primary | ICD-10-CM

## 2024-06-05 PROCEDURE — 72050 X-RAY EXAM NECK SPINE 4/5VWS: CPT

## 2024-06-05 PROCEDURE — 99214 OFFICE O/P EST MOD 30 MIN: CPT | Performed by: NURSE PRACTITIONER

## 2024-06-05 PROCEDURE — 73030 X-RAY EXAM OF SHOULDER: CPT

## 2024-06-05 RX ORDER — CYCLOBENZAPRINE HCL 5 MG
5 TABLET ORAL 2 TIMES DAILY PRN
Qty: 20 TABLET | Refills: 1 | Status: SHIPPED | OUTPATIENT
Start: 2024-06-05 | End: 2024-06-15

## 2024-06-05 RX ORDER — METHYLPREDNISOLONE ACETATE 80 MG/ML
120 INJECTION, SUSPENSION INTRA-ARTICULAR; INTRALESIONAL; INTRAMUSCULAR; SOFT TISSUE ONCE
Status: COMPLETED | OUTPATIENT
Start: 2024-06-05 | End: 2024-06-05

## 2024-06-05 RX ORDER — KETOROLAC TROMETHAMINE 30 MG/ML
60 INJECTION, SOLUTION INTRAMUSCULAR; INTRAVENOUS ONCE
Status: COMPLETED | OUTPATIENT
Start: 2024-06-05 | End: 2024-06-05

## 2024-06-05 RX ADMIN — METHYLPREDNISOLONE ACETATE 120 MG: 80 INJECTION, SUSPENSION INTRA-ARTICULAR; INTRALESIONAL; INTRAMUSCULAR; SOFT TISSUE at 16:28

## 2024-06-05 RX ADMIN — KETOROLAC TROMETHAMINE 60 MG: 30 INJECTION, SOLUTION INTRAMUSCULAR; INTRAVENOUS at 16:27

## 2024-06-05 ASSESSMENT — ENCOUNTER SYMPTOMS
BACK PAIN: 1
RESPIRATORY NEGATIVE: 1

## 2024-06-05 ASSESSMENT — PATIENT HEALTH QUESTIONNAIRE - PHQ9
SUM OF ALL RESPONSES TO PHQ QUESTIONS 1-9: 0
SUM OF ALL RESPONSES TO PHQ QUESTIONS 1-9: 0
SUM OF ALL RESPONSES TO PHQ9 QUESTIONS 1 & 2: 0
1. LITTLE INTEREST OR PLEASURE IN DOING THINGS: NOT AT ALL
SUM OF ALL RESPONSES TO PHQ QUESTIONS 1-9: 0
SUM OF ALL RESPONSES TO PHQ QUESTIONS 1-9: 0
2. FEELING DOWN, DEPRESSED OR HOPELESS: NOT AT ALL

## 2024-06-05 NOTE — PROGRESS NOTES
[facility]  [facility]  [address]  [place] [address]  Dept: [phone]  Dept Fax: [phone]  Loc: [phone]    Vanessa Palmer is a 54 y.o. femalewho presents today for her medical conditions/complaints as noted below.  Vanessa Palmeris c/o of Follow-up (Neck and shoulder pain started last night, more when moving )      :     HPI    Pt presents today after a work incident last evening .  She is  a  at [employer] and uses a machine/cart and she needed to replace the battery on her cart and had to lift a metal plate to get to the battery.    When lifting the metal plate with both hands she felt a sharp pain at the back of her neck that radiated down her left arm.    Today she continues to have constant posterior neck pain radiating  down her left arm with N/T of LUE    Pain worse with movement  Pain is 8/10     Pt denies a H/a or N/V or dizziness    Admits to LUE weakness   Has bene taking tylenol not helping     Current Outpatient Medications   Medication Sig Dispense Refill    cyclobenzaprine (FLEXERIL) 5 MG tablet Take 1 tablet by mouth 2 times daily as needed for Muscle spasms 20 tablet 1    levocetirizine (XYZAL) 5 MG tablet Take 1 tablet by mouth nightly 90 tablet 3    triamcinolone (NASACORT) 55 MCG/ACT nasal inhaler 2 sprays by Each Nostril route daily 1 each 3    fluticasone (FLONASE) 50 MCG/ACT nasal spray 1 spray by Each Nostril route daily (Patient not taking: Reported on 9/28/2023) 32 g 1     Current Facility-Administered Medications   Medication Dose Route Frequency Provider Last Rate Last Admin    methylPREDNISolone acetate (DEPO-MEDROL) injection 120 mg  120 mg IntraMUSCular Once Satnam Steve, APRN - CNP        ketorolac (TORADOL) injection 60 mg  60 mg IntraMUSCular Once Satnam Steve, APRN - CNP           Past Medical History:   Diagnosis Date    Headache       Past Surgical History:   Procedure Laterality Date

## 2024-06-05 NOTE — PROGRESS NOTES
Medication(s) given during visit:    Administrations This Visit       ketorolac (TORADOL) injection 60 mg       Admin Date  06/05/2024  16:27 Action  Given Dose  60 mg Route  IntraMUSCular Site  Dorsogluteal Left Administered By  Audrey Koroma LPN    Ordering Provider: Steve Hay APRN - CNP    NDC: 41015-317-51    Lot#: [id number]    : [employer] [facility]    Patient Supplied?: No              methylPREDNISolone acetate (DEPO-MEDROL) injection 120 mg       Admin Date  06/05/2024  16:28 Action  Given Dose  120 mg Route  IntraMUSCular Site  Dorsogluteal Right Administered By  Audrey Koroma LPN    Ordering Provider: Steve Hay APRN - CNP    NDC: [address]-2    Lot#: [id number]    : [employer]    Patient Supplied?: No    Comments: 120 mg given                    Patient instructed to remain in clinic for 20 minutes after injection and was advised to report any adverse reaction to me immediately.

## 2024-06-06 ENCOUNTER — TELEPHONE (OUTPATIENT)
Dept: FAMILY MEDICINE CLINIC | Age: 54
End: 2024-06-06

## 2024-06-06 NOTE — TELEPHONE ENCOUNTER
----- Message from BLAIR Hermosillo CNP sent at 6/5/2024  5:21 PM EDT -----  Let pt know shoulder x-ray is negative for any fractures, it does show a mild bone spur and joint space narrowing at the AC joint(arthritis) follow through with plan discussed in the office.     FINDINGS: There is no fracture or dislocation. Joint space narrowing and  osteophyte formation are present at the acromioclavicular joint. Soft tissues  are unremarkable.

## 2024-06-11 ENCOUNTER — TELEPHONE (OUTPATIENT)
Dept: FAMILY MEDICINE CLINIC | Age: 54
End: 2024-06-11

## 2024-06-11 DIAGNOSIS — M48.02 CERVICAL SPINAL STENOSIS: ICD-10-CM

## 2024-06-11 DIAGNOSIS — M54.12 CERVICAL RADICULOPATHY: Primary | ICD-10-CM

## 2024-06-11 NOTE — TELEPHONE ENCOUNTER
----- Message from BLAIR Hermosillo - CNP sent at 6/10/2024  7:43 AM EDT -----  Let pt know her neck x-ray identified   1. 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  2. Moderate spondylosis scattered in the cervical spine. Moderate to space  narrowing C3-4 and C6/7.  3. No acute fracture. Prevertebral soft tissues are unremarkable. Straightening  of the normal cervical lordosis, unchanged from prior study.    Pt has arthritis causing her pain, as next steps I would recommend an EMG study which tests the nerves to see if the nerves are being impinged causing her symptoms along with an MRI of the cervical spinal canal.   AS her symptoms started as a work injury, has she followed up with [facility] health yet or would she like to proceed with Tx without a workers comp claim?

## 2024-06-11 NOTE — TELEPHONE ENCOUNTER
Patient informed, verbally understood.    Pt is calling her employer to verify if this will be a workman's comp claim. She will call the office letting us know.

## 2024-06-11 NOTE — TELEPHONE ENCOUNTER
This is not a workman's comp claim.   Pt is wanting to move forward with the EMG and MRI.    Pt is also asking to be taken off of work till Thursday (6/13)      Please advise

## 2024-06-12 ENCOUNTER — TELEPHONE (OUTPATIENT)
Dept: FAMILY MEDICINE CLINIC | Age: 54
End: 2024-06-12

## 2024-06-12 NOTE — TELEPHONE ENCOUNTER
Orders placed for both MRI and EMG, please extend pt time ff, write up work note and I will sign, does she need anything different for pain? Thanks

## 2024-06-12 NOTE — TELEPHONE ENCOUNTER
Patient informed, verbally understood.  Transferred to  for MRI    Letter written, signed and placed at  for .

## 2024-06-19 DIAGNOSIS — M54.2 CERVICALGIA: Primary | ICD-10-CM

## 2024-06-19 RX ORDER — NAPROXEN 500 MG/1
500 TABLET ORAL 2 TIMES DAILY WITH MEALS
Qty: 60 TABLET | Refills: 3 | Status: SHIPPED | OUTPATIENT
Start: 2024-06-19

## 2024-06-19 RX ORDER — NAPROXEN 500 MG/1
500 TABLET ORAL 2 TIMES DAILY WITH MEALS
Qty: 60 TABLET | Refills: 3 | Status: SHIPPED | OUTPATIENT
Start: 2024-06-19 | End: 2024-06-19 | Stop reason: SDUPTHER

## 2024-07-08 ENCOUNTER — HOSPITAL ENCOUNTER (OUTPATIENT)
Dept: MRI IMAGING | Age: 54
Discharge: HOME | End: 2024-07-08
Payer: COMMERCIAL

## 2024-07-08 DIAGNOSIS — M54.12 CERVICAL RADICULOPATHY: ICD-10-CM

## 2024-07-08 DIAGNOSIS — M48.02 CERVICAL SPINAL STENOSIS: ICD-10-CM

## 2024-07-08 PROCEDURE — 72141 MRI NECK SPINE W/O DYE: CPT

## 2024-07-09 ENCOUNTER — TELEPHONE (OUTPATIENT)
Dept: FAMILY MEDICINE CLINIC | Age: 54
End: 2024-07-09

## 2024-07-09 DIAGNOSIS — M48.02 CERVICAL STENOSIS OF SPINE: ICD-10-CM

## 2024-07-09 DIAGNOSIS — M54.2 CERVICALGIA: ICD-10-CM

## 2024-07-09 DIAGNOSIS — E04.2 MULTIPLE THYROID NODULES: Primary | ICD-10-CM

## 2024-07-09 NOTE — TELEPHONE ENCOUNTER
Referral placed to [facility] in [facility] U/s thyroid ordered, pt needs this scheduled thanks

## 2024-07-09 NOTE — TELEPHONE ENCOUNTER
----- Message from BLAIR Hermosillo - CNP sent at 7/9/2024  8:04 AM EDT -----  Let pt know her MRI of neck identifies severe stenosis at the level of C5-6 meaning her spinal cord is getting pinched by her vertebrae causing her pain and symptoms, she also has moderate stenosis at the level of C3-4 and C6-7. Due to this I recommend a referral either to PM or ortho, she also has a thyroid nodule of right lobe which was aspirated in 2022 and was a cyst.  The left lobe of the thyroid gland also identifies a nodule, I recommend a dedicated u/s of the thyroid gland to better evaluate these nodules, if agreeable I will order it.

## 2024-07-09 NOTE — TELEPHONE ENCOUNTER
Called and spoke to patient, patient aware of information below and I transferred patient over to [facility] to get US scheduled.    Future Appointments   Date Time Provider Department Center   7/23/2024 12:00 PM Waylon Pang MD N [facility] Neurology -   7/29/2024 12:30 PM STR ULTRASOUND RM 2 [facility] US STR Rad/Card   9/30/2024 11:20 AM Steve Hay APRN - CNP [facility] [place]

## 2024-07-09 NOTE — TELEPHONE ENCOUNTER
Pt informed and understanding. Would like Ortho consult to [facility] in [facility]. She would also like the U/S ordered. Would like to hold off on PM until she see [facility].

## 2024-07-29 ENCOUNTER — HOSPITAL ENCOUNTER (OUTPATIENT)
Dept: ULTRASOUND IMAGING | Age: 54
Discharge: HOME | End: 2024-07-29
Payer: COMMERCIAL

## 2024-07-29 DIAGNOSIS — E04.2 MULTIPLE THYROID NODULES: ICD-10-CM

## 2024-07-29 PROCEDURE — 76536 US EXAM OF HEAD AND NECK: CPT

## 2024-07-30 ENCOUNTER — TELEPHONE (OUTPATIENT)
Dept: FAMILY MEDICINE CLINIC | Age: 54
End: 2024-07-30

## 2024-07-30 DIAGNOSIS — E04.1 NODULE OF RIGHT LOBE OF THYROID GLAND: Primary | ICD-10-CM

## 2024-07-30 DIAGNOSIS — E04.1 RIGHT THYROID NODULE: Primary | ICD-10-CM

## 2024-07-30 DIAGNOSIS — E01.0 THYROMEGALY: ICD-10-CM

## 2024-07-30 NOTE — TELEPHONE ENCOUNTER
----- Message from BLAIR Hermosillo - CNP sent at 7/30/2024 12:13 PM EDT -----  Let pt know her u/s of thyroid gland identified a few changes  She had a solid nodule in the right mid lobe that is 2.8 cm in size and radiology recommends a FNA of this as it has changed since the previous u/s     The left lobe thyroid nodule has mildly increased in size but mcnair snot meet guidelines for FNA  Overall her thyroid gland Is mildly enlarged and radiology recommend a 1 yr f/u u/s of thyroid  I will place the orders for the FNA of the right thyroid lobe nodule.     Let me know if she has questions.     IMPRESSION:  1. A 2.8 x 2.5 x 2.4 cm predominantly solid nodule with cystic spaces is seen in  the mid right thyroid lobe. Fine-needle aspiration of this nodule is recommended  given the change in morphology.  2. The 1.2 x 0.9 x 0.8 cm isoechoic nodule in the inferior left thyroid lobe is  a TR 3 nodule and has shown mild interval increase in size.  3. A 9 x 8 x 4 mm mixed cystic and solid nodule in the isthmus has shown mild  interval increase in size and is a TR 3 nodule.  4. Mild thyromegaly.  5. 1 year follow-up can also be obtained.

## 2024-07-30 NOTE — TELEPHONE ENCOUNTER
Called patient and informed of message.   Patient verbalized understanding.   No questions or concerns at this time.      Please order FNA for right lobe of thyroid.   Patient willing to complete at [facility].     Thank you.

## 2024-08-07 ENCOUNTER — OFFICE VISIT (OUTPATIENT)
Dept: PHYSICAL MEDICINE AND REHAB | Age: 54
End: 2024-08-07
Payer: COMMERCIAL

## 2024-08-07 VITALS
BODY MASS INDEX: 26.06 KG/M2 | WEIGHT: 166 LBS | SYSTOLIC BLOOD PRESSURE: 142 MMHG | DIASTOLIC BLOOD PRESSURE: 88 MMHG | HEIGHT: 67 IN

## 2024-08-07 DIAGNOSIS — M79.18 MYOFASCIAL PAIN: ICD-10-CM

## 2024-08-07 DIAGNOSIS — G89.4 CHRONIC PAIN SYNDROME: ICD-10-CM

## 2024-08-07 DIAGNOSIS — M48.02 CERVICAL STENOSIS OF SPINAL CANAL: ICD-10-CM

## 2024-08-07 DIAGNOSIS — M47.812 CERVICAL SPONDYLOSIS: ICD-10-CM

## 2024-08-07 DIAGNOSIS — M47.812 FACET HYPERTROPHY OF CERVICAL REGION: ICD-10-CM

## 2024-08-07 DIAGNOSIS — M54.12 CERVICAL RADICULOPATHY: Primary | ICD-10-CM

## 2024-08-07 PROBLEM — F43.23 ADJUSTMENT DISORDER WITH MIXED ANXIETY AND DEPRESSED MOOD: Status: ACTIVE | Noted: 2017-03-20

## 2024-08-07 PROCEDURE — 99215 OFFICE O/P EST HI 40 MIN: CPT | Performed by: NURSE PRACTITIONER

## 2024-08-07 RX ORDER — PREGABALIN 50 MG/1
50 CAPSULE ORAL 2 TIMES DAILY
Qty: 60 CAPSULE | Refills: 0 | Status: SHIPPED | OUTPATIENT
Start: 2024-08-07 | End: 2024-09-06

## 2024-08-07 RX ORDER — CYCLOBENZAPRINE HCL 5 MG
5 TABLET ORAL 3 TIMES DAILY PRN
COMMUNITY
End: 2024-08-07 | Stop reason: SDUPTHER

## 2024-08-07 RX ORDER — CYCLOBENZAPRINE HCL 5 MG
5 TABLET ORAL NIGHTLY PRN
Qty: 30 TABLET | Refills: 0 | Status: SHIPPED | OUTPATIENT
Start: 2024-08-07

## 2024-08-07 NOTE — PROGRESS NOTES
Functionality Assessment/Goals Worksheet     On a scale of 0 (Does not Interfere) to 10 (Completely Interferes)     1.  Which number describes how during the past week pain has interfered with           the following:  A.  General Activity:  7  B.  Mood: 7  C.  Walking Ability:  9  D.  Normal Work (Includes both work outside the home and housework):  8  E.  Relations with Other People:   6  F.  Sleep:   8  G.  Enjoyment of Life:   7    2.  Patient Prefers to Take their Pain Medications:     []  On a regular basis   [x]  Only when necessary    []  Does not take pain medications    3.  What are the Patient's Goals/Expectations for Visiting Pain Management?     [x]  Learn about my pain    []  Receive Medication   []  Physical Therapy     []  Treat Depression   []  Receive Injections    []  Treat Sleep   []  Deal with Anxiety and Stress   []  Treat Opoid Dependence/Addiction   []  Other:                                
their prescribing provider for refills if needed.   The side effect profile of long-term opioid use was discussed and recommended emphasis on multimodal strategies for pain management.     Patient is taking Acetaminophen. Patient informed that the maximum amount of acetaminophen taken on a regular basis should only be 4000 mg per day.     Patient is taking naproxen. Patient is advised to take as prescribed and not take on an empty stomach.       Adjuvants:   In light of the presence of a neuropathic and myofascial component of pain the patient is advised to start Lyrica 50 mg twice daily.    I have taken over her Flexeril 5 mg nightly as needed for pain/spasms.    Patient is advised to take the medication as prescribed as taking more than prescribed can lead to the development of tolerance, physical dependency, and addiction.  Patient is advised to store and lock the medication in a safe and secure location.  If the medication is lost or stolen we will not provide early refills on this medication.  Patient understands that they must stay compliant with treatment plan outlined above in order to stay compliant with opioid therapy and any deviation from treatment plan will result in cessation of opioid therapy.  Risks of long-term opioid therapy were discussed in extensive detail with the patient and patient understands the risks involved with chronic, continuous opioids.      Patient has been compliant with office visits, rehabilitation plan including attending therapy as warranted, and injection therapy.  Patient has not demonstrated any aberrant behavior with medication.  Pain contract signed and reviewed by patient (8/7/2024).  Patient understands if the contract is violated in any way opioid therapy will be discontinued immediately.  OARRS reviewed and is appropriate.  Naloxone offered to patient.  Last UDS:     Interventions:   None, pending physical therapy    Procedure educations:  Discussed with patient about

## 2024-08-08 ENCOUNTER — PATIENT MESSAGE (OUTPATIENT)
Dept: PHYSICAL MEDICINE AND REHAB | Age: 54
End: 2024-08-08

## 2024-08-12 ENCOUNTER — TELEPHONE (OUTPATIENT)
Dept: FAMILY MEDICINE CLINIC | Age: 54
End: 2024-08-12

## 2024-08-12 ENCOUNTER — HOSPITAL ENCOUNTER (OUTPATIENT)
Dept: ULTRASOUND IMAGING | Age: 54
Discharge: HOME | End: 2024-08-12
Payer: COMMERCIAL

## 2024-08-12 DIAGNOSIS — E04.1 RIGHT THYROID NODULE: ICD-10-CM

## 2024-08-12 PROCEDURE — 88172 CYTP DX EVAL FNA 1ST EA SITE: CPT

## 2024-08-12 PROCEDURE — 76942 ECHO GUIDE FOR BIOPSY: CPT

## 2024-08-12 PROCEDURE — 88305 TISSUE EXAM BY PATHOLOGIST: CPT

## 2024-08-12 PROCEDURE — 88333 PATH CONSLTJ SURG CYTO XM 1: CPT

## 2024-08-12 PROCEDURE — 88173 CYTOPATH EVAL FNA REPORT: CPT

## 2024-08-12 PROCEDURE — 88177 CYTP FNA EVAL EA ADDL: CPT

## 2024-08-12 NOTE — H&P
Formulation and discussion of sedation / procedure plans, risks, benefits, side effects and alternatives with patient and/or responsible adult completed.    History and Physical reviewed and unchanged.    Electronically signed by Samuel Lowery MD on 8/12/24 at 3:00 PM EDT

## 2024-08-12 NOTE — OP NOTE
Department of Radiology  Post Procedure Progress Note      Pre-Procedure Diagnosis:  thyroid nodules     Procedure Performed:  FNA right thyroid nodule     Anesthesia: local    Findings: successful    Immediate Complications:  None    Estimated Blood Loss: minimal    SEE DICTATED PROCEDURE NOTE FOR COMPLETE DETAILS.    Samuel Lowery MD   8/12/2024 3:00 PM

## 2024-08-15 ENCOUNTER — TELEPHONE (OUTPATIENT)
Dept: FAMILY MEDICINE CLINIC | Age: 54
End: 2024-08-15

## 2024-08-15 NOTE — TELEPHONE ENCOUNTER
----- Message from BLAIR Hermosillo - CNP sent at 8/14/2024  5:34 PM EDT -----  Let pt know her FNA identified benign appearing colloid cystic material of her thyroid gland

## 2024-08-21 ENCOUNTER — PROCEDURE VISIT (OUTPATIENT)
Dept: NEUROLOGY | Age: 54
End: 2024-08-21
Payer: COMMERCIAL

## 2024-08-21 DIAGNOSIS — M54.2 NECK PAIN: ICD-10-CM

## 2024-08-21 DIAGNOSIS — G56.02 LEFT CARPAL TUNNEL SYNDROME: ICD-10-CM

## 2024-08-21 DIAGNOSIS — M54.12 CERVICAL RADICULOPATHY: Primary | ICD-10-CM

## 2024-08-21 DIAGNOSIS — M79.602 BILATERAL ARM PAIN: ICD-10-CM

## 2024-08-21 DIAGNOSIS — M79.601 BILATERAL ARM PAIN: ICD-10-CM

## 2024-08-21 PROCEDURE — 95886 MUSC TEST DONE W/N TEST COMP: CPT | Performed by: PSYCHIATRY & NEUROLOGY

## 2024-08-21 PROCEDURE — 95911 NRV CNDJ TEST 9-10 STUDIES: CPT | Performed by: PSYCHIATRY & NEUROLOGY

## 2024-08-22 ENCOUNTER — HOSPITAL ENCOUNTER (OUTPATIENT)
Dept: PHYSICAL THERAPY | Age: 54
Setting detail: THERAPIES SERIES
Discharge: HOME | End: 2024-08-22
Payer: COMMERCIAL

## 2024-08-22 PROCEDURE — 97162 PT EVAL MOD COMPLEX 30 MIN: CPT

## 2024-08-22 PROCEDURE — 97110 THERAPEUTIC EXERCISES: CPT

## 2024-08-22 NOTE — PROGRESS NOTES
** PLEASE SIGN, DATE AND TIME CERTIFICATION BELOW AND RETURN TO [facility] OUTPATIENT REHABILITATION (FAX #: [phone]).  ATTEST/CO-SIGN IF ACCESSING VIA [employer].  THANK YOU.**    I certify that I have examined the patient below and determined that Physical Medicine and Rehabilitation service is necessary and that I approve the established plan of care for up to 90 days or as specifically noted.  Attestation, signature or co-signature of physician indicates approval of certification requirements.    ________________________ ____________ __________  Physician Signature   Date   Time  [facility]  PHYSICAL THERAPY  [x] EVALUATION  [] DAILY NOTE (LAND) [] DAILY NOTE (AQUATIC ) [] PROGRESS NOTE [] DISCHARGE NOTE    [x] OUTPATIENT REHABILITATION [facility]   [] [facility]    [] [facility]   [] [facility]    Date: 2024  Patient Name:  Vanessa Palmer  : 1970  MRN: [id number]  CSN: [id number]    Referring Practitioner Sheron Chacko APRN - CNP    Diagnosis Radiculopathy, cervical region  Spinal stenosis, cervical region  Spondylosis without myelopathy or radiculopathy, cervical region  Myalgia, other site  Chronic pain syndrome   Treatment Diagnosis M54.2  Neck Pain  R29.3 Abnormal Posture  R53.1 Weakness  M25.60 Stiffness of Unspecified Joint   Date of Evaluation 24    Additional Pertinent History Arthritis       Functional Outcome Measure Used Neck Disability Index   Functional Outcome Score 13/50 (24)       Insurance: Primary: Payor: R /  /  / ,   Secondary:    Authorization Information: PRE CERTIFICATION REQUIRED: No precert required.  INSURANCE THERAPY BENEFIT: Allowed 60 visits of OT, PT, and ST COMBINED per calendar year.  0 visits have been used.. Hard Max..   AQUATIC THERAPY COVERED: Yes  MODALITIES COVERED:  Yes   Approved Procedure Codes: Authorization of Specific CPT Codes Not Required  (Codes requested indicated

## 2024-08-26 ENCOUNTER — TELEPHONE (OUTPATIENT)
Dept: FAMILY MEDICINE CLINIC | Age: 54
End: 2024-08-26

## 2024-08-26 ENCOUNTER — HOSPITAL ENCOUNTER (OUTPATIENT)
Dept: PHYSICAL THERAPY | Age: 54
Setting detail: THERAPIES SERIES
Discharge: HOME | End: 2024-08-26
Payer: COMMERCIAL

## 2024-08-26 PROCEDURE — 97110 THERAPEUTIC EXERCISES: CPT

## 2024-08-26 NOTE — PROGRESS NOTES
[facility]  PHYSICAL THERAPY  [] EVALUATION  [x] DAILY NOTE (LAND) [] DAILY NOTE (AQUATIC ) [] PROGRESS NOTE [] DISCHARGE NOTE    [x] OUTPATIENT REHABILITATION CENTER [facility]   [] [facility]    [] [facility]   [] [facility]    Date: 2024  Patient Name:  Vanessa Palmer  : 1970  MRN: [id number]  CSN: [id number]    Referring Practitioner Sheron Chacko APRN - CNP    Diagnosis Radiculopathy, cervical region  Spinal stenosis, cervical region  Spondylosis without myelopathy or radiculopathy, cervical region  Myalgia, other site  Chronic pain syndrome   Treatment Diagnosis M54.2  Neck Pain  R29.3 Abnormal Posture  R53.1 Weakness  M25.60 Stiffness of Unspecified Joint   Date of Evaluation 24    Additional Pertinent History Arthritis       Functional Outcome Measure Used Neck Disability Index   Functional Outcome Score 13/50 (24)       Insurance: Primary: Payor: R /  /  / ,   Secondary:    Authorization Information: PRE CERTIFICATION REQUIRED: No precert required.  INSURANCE THERAPY BENEFIT: Allowed 60 visits of OT, PT, and ST COMBINED per calendar year.  0 visits have been used.. Hard Max..   AQUATIC THERAPY COVERED: Yes  MODALITIES COVERED:  Yes   Approved Procedure Codes: Authorization of Specific CPT Codes Not Required  (Codes requested indicated by red font, codes approved indicated by black font)   Visit # 2, 2/10 for progress note (Reporting Period: 24 to  )   Visits Allowed: 60 visits   Recertification Date: 10/18/24   Physician Follow-Up:    Physician Orders:    History of Present Illness: Patient reports that she started having neck pain about 25 years ago. Patient did PT in the past and believes it was helpful. Patient reports that neck pain flared back up about 2 months ago. Patient reports that her pain will radiate from her neck down her shoulders and also up into her head at times B L > R. Patient reports that  well. Patient noted a little bit of pain during exercises. Cues to not push into painful end ranges. Cues to limit resistance amount for isometric exercises with improved tolerance noted. Patient reports a 4/10 post-treatment. Educated patient on monitoring response to session. Plan to progress with strengthening and mobility exercises per patients tolerance.      GOALS:  Patient Goal: to improve neck pain     Short Term Goals: 4 weeks  Patient will report decrease in pain to 5-6/10 at most to allow ease of ADLs and household tasks.  2. Patient will improve B cervical lateral flexion AROM to 35-40 degrees to allow decreased pain with household tasks.  3. Patient will improve B cervical rotation AROM to 60-75 degrees to allow decreased pain with turning head during driving.    Long Term Goals: 8 weeks  Patient will improve Neck Disability Index Score from 13/50 to 3/50 to allow decreased disability and improved functional mobility.   2. Patient will be I with HEP to reduce risk of re-injury and improve mobility.   3. Patient will improve B shoulder flexion AROM from 142-150 degrees to 160 degrees to allow decreased pain with reaching overhead.  4. Patient will improved upright posture with patient able to sit upright with minimal to no cues to improve pain with sitting while driving during work.     Patient Education:   [x]  HEP/Education Completed: Continue with HEP, HEP updated  [employer] Access Code: [email or web address]  []  No new Education completed  []  Reviewed Prior HEP      [x]  Patient verbalized and/or demonstrated understanding of education provided.  []  Patient unable to verbalize and/or demonstrate understanding of education provided.  Will continue education.  []  Barriers to learning:     PLAN:  Treatment Recommendations: Strengthening, Range of Motion, Neuromuscular Re-education, Manual Therapy - Soft Tissue Mobilization, Manual Therapy - Joint Manipulation, Pain Management, Home Exercise Program, Patient

## 2024-08-26 NOTE — TELEPHONE ENCOUNTER
Let pt know her EMG identified cervical radiculopathy at C5-6 and C6-7 along with mild left carpal tunnel syndrome. Thi is consistent with mild nerve impingement in the neck and left wrist, recommend f/u with PM for further tx and management,  I will forward the EMG results to her PM provider.

## 2024-08-28 ENCOUNTER — OFFICE VISIT (OUTPATIENT)
Dept: PHYSICAL MEDICINE AND REHAB | Age: 54
End: 2024-08-28
Payer: COMMERCIAL

## 2024-08-28 VITALS
SYSTOLIC BLOOD PRESSURE: 108 MMHG | BODY MASS INDEX: 26.06 KG/M2 | HEIGHT: 67 IN | DIASTOLIC BLOOD PRESSURE: 68 MMHG | WEIGHT: 166 LBS

## 2024-08-28 DIAGNOSIS — M79.18 MYOFASCIAL PAIN: ICD-10-CM

## 2024-08-28 DIAGNOSIS — M47.812 CERVICAL SPONDYLOSIS: ICD-10-CM

## 2024-08-28 DIAGNOSIS — G89.4 CHRONIC PAIN SYNDROME: ICD-10-CM

## 2024-08-28 DIAGNOSIS — M47.812 FACET HYPERTROPHY OF CERVICAL REGION: ICD-10-CM

## 2024-08-28 DIAGNOSIS — M48.02 CERVICAL STENOSIS OF SPINAL CANAL: ICD-10-CM

## 2024-08-28 DIAGNOSIS — M54.12 CERVICAL RADICULOPATHY: Primary | ICD-10-CM

## 2024-08-28 PROCEDURE — 99214 OFFICE O/P EST MOD 30 MIN: CPT | Performed by: NURSE PRACTITIONER

## 2024-08-28 NOTE — PROGRESS NOTES
Chronic Pain/PM&R Clinic Note     Encounter Date: 8/28/24    Subjective:   Chief Complaint:   Chief Complaint   Patient presents with    Follow-up     Follow-up       History of Present Illness:   Vanessa Palmer is a 54 y.o. female seen in the clinic initially on 08/07/2024 upon request from BLAIR Eldridge for her history of neck pain. Patient has a personal medical history of rhinitis, anxiety, depression.    Patient presents today with complaints of bilateral neck pain that radiates down between her shoulder blades, across both tops of her shoulders, and down both arms to her fingertips.  She states the left side is worse than right but they are both becoming more bothersome.  She states this pain has been occurring for about 25 years, when she fell asleep on a couch and woke up with a very stiff necks.  She states she ended up seeing a provider, and getting some therapy completed and they told her she had a pinched nerve starting then.  She states that it felt a little bit better after well, but then has worsened again in the last 2 to 3 months.  She denies any injury that caused the pain worsened recently.  She describes the pain as a numbness, aching, tingling, sharp, shooting pain.  She states it varies in what it feels like depend on what she is doing.  She states pain is worse with lifting, pushing, pulling, turning her head, lying down, working, activities around the house.  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  She states pain is somewhat better with resting, certain positions, sitting straight up, heat.  She states the symptoms help but do not last.  She states she last did formal physical therapy 25 years ago, which helped some at the time but she is unsure.  She states she has not been doing any home exercise program.  She states that she has been utilizing Voltaren gel with some relief, and her primary has her on naproxen and Flexeril.   strategies for pain management.     Patient is taking Acetaminophen. Patient informed that the maximum amount of acetaminophen taken on a regular basis should only be 4000 mg per day.     Patient is taking naproxen. Patient is advised to take as prescribed and not take on an empty stomach.       Adjuvants:   In light of the presence of a neuropathic and myofascial component of pain the patient is advised to start Lyrica 50 mg at night    I have taken over her Flexeril 5 mg nightly as needed for pain/spasms.    Patient is advised to take the medication as prescribed as taking more than prescribed can lead to the development of tolerance, physical dependency, and addiction.  Patient is advised to store and lock the medication in a safe and secure location.  If the medication is lost or stolen we will not provide early refills on this medication.  Patient understands that they must stay compliant with treatment plan outlined above in order to stay compliant with opioid therapy and any deviation from treatment plan will result in cessation of opioid therapy.  Risks of long-term opioid therapy were discussed in extensive detail with the patient and patient understands the risks involved with chronic, continuous opioids.      Patient has been compliant with office visits, rehabilitation plan including attending therapy as warranted, and injection therapy.  Patient has not demonstrated any aberrant behavior with medication.  Pain contract signed and reviewed by patient (8/7/2024).  Patient understands if the contract is violated in any way opioid therapy will be discontinued immediately.  OARRS reviewed and is appropriate.  Naloxone offered to patient.  Last UDS:     Interventions:   None, pending physical therapy    Procedure educations:  Discussed with patient about risks with procedure including infection, reaction to medication, increased pain, failure of procedures, worsening of symptoms, or

## 2024-08-28 NOTE — PROGRESS NOTES
Functionality Assessment/Goals Worksheet     On a scale of 0 (Does not Interfere) to 10 (Completely Interferes)     1.  Which number describes how during the past week pain has interfered with           the following:  A.  General Activity:  4  B.  Mood: 3  C.  Walking Ability:  2  D.  Normal Work (Includes both work outside the home and housework):  5  E.  Relations with Other People:   1  F.  Sleep:   2  G.  Enjoyment of Life:   3    2.  Patient Prefers to Take their Pain Medications:     [x]  On a regular basis   []  Only when necessary    []  Does not take pain medications    3.  What are the Patient's Goals/Expectations for Visiting Pain Management?     [x]  Learn about my pain    []  Receive Medication   []  Physical Therapy     []  Treat Depression   []  Receive Injections    []  Treat Sleep   []  Deal with Anxiety and Stress   []  Treat Opoid Dependence/Addiction   []  Other:

## 2024-08-29 ENCOUNTER — HOSPITAL ENCOUNTER (OUTPATIENT)
Dept: PHYSICAL THERAPY | Age: 54
Setting detail: THERAPIES SERIES
Discharge: HOME | End: 2024-08-29
Payer: COMMERCIAL

## 2024-08-29 PROCEDURE — 97110 THERAPEUTIC EXERCISES: CPT

## 2024-08-29 NOTE — PROGRESS NOTES
[facility]  PHYSICAL THERAPY  [] EVALUATION  [x] DAILY NOTE (LAND) [] DAILY NOTE (AQUATIC ) [] PROGRESS NOTE [] DISCHARGE NOTE    [x] OUTPATIENT REHABILITATION CENTER [facility]   [] [place] [facility]    [] [facility] [place] [place]   [] [facility]    Date: 2024  Patient Name:  Vanessa Palmer  : 1970  MRN: [id number]  CSN: [id number]    Referring Practitioner Sheron Chacko APRN - CNP    Diagnosis Radiculopathy, cervical region  Spinal stenosis, cervical region  Spondylosis without myelopathy or radiculopathy, cervical region  Myalgia, other site  Chronic pain syndrome   Treatment Diagnosis M54.2  Neck Pain  R29.3 Abnormal Posture  R53.1 Weakness  M25.60 Stiffness of Unspecified Joint   Date of Evaluation 24    Additional Pertinent History Arthritis       Functional Outcome Measure Used Neck Disability Index   Functional Outcome Score 13/50 (24)       Insurance: Primary: Payor: UMR /  /  / ,   Secondary:    Authorization Information: PRE CERTIFICATION REQUIRED: No precert required.  INSURANCE THERAPY BENEFIT: Allowed 60 visits of OT, PT, and ST COMBINED per calendar year.  0 visits have been used.. Hard Max..   AQUATIC THERAPY COVERED: Yes  MODALITIES COVERED:  Yes   Approved Procedure Codes: Authorization of Specific CPT Codes Not Required  (Codes requested indicated by red font, codes approved indicated by black font)   Visit # 3, 3/10 for progress note (Reporting Period: 24 to  )   Visits Allowed: 60 visits   Recertification Date: 10/18/24   Physician Follow-Up:    Physician Orders:    History of Present Illness: Patient reports that she started having neck pain about 25 years ago. Patient did PT in the past and believes it was helpful. Patient reports that neck pain flared back up about 2 months ago. Patient reports that her pain will radiate from her neck down her shoulders and also up into her head at times B L > R. Patient reports that she  as follows:    []  Hold pending physician visit  []  Discharge    Time In 1117   Time Out 1145   Timed Code Minutes: 28 min   Total Treatment Time: 28 min       Electronically Signed by: Viviana Calero PT

## 2024-09-03 ENCOUNTER — HOSPITAL ENCOUNTER (OUTPATIENT)
Dept: PHYSICAL THERAPY | Age: 54
Setting detail: THERAPIES SERIES
Discharge: HOME | End: 2024-09-03
Payer: COMMERCIAL

## 2024-09-03 PROCEDURE — 97110 THERAPEUTIC EXERCISES: CPT

## 2024-09-03 NOTE — PROGRESS NOTES
[facility]  PHYSICAL THERAPY  [] EVALUATION  [x] DAILY NOTE (LAND) [] DAILY NOTE (AQUATIC ) [] PROGRESS NOTE [] DISCHARGE NOTE    [x] OUTPATIENT REHABILITATION CENTER [facility]   [] [place] [facility]    [] [facility]   [] [facility]    Date: 9/3/2024  Patient Name:  Vanessa Palmer  : 1970  MRN: [id number]  CSN: [id number]    Referring Practitioner Sheron Chacko APRN - CNP    Diagnosis Radiculopathy, cervical region  Spinal stenosis, cervical region  Spondylosis without myelopathy or radiculopathy, cervical region  Myalgia, other site  Chronic pain syndrome   Treatment Diagnosis M54.2  Neck Pain  R29.3 Abnormal Posture  R53.1 Weakness  M25.60 Stiffness of Unspecified Joint   Date of Evaluation 24    Additional Pertinent History Arthritis       Functional Outcome Measure Used Neck Disability Index   Functional Outcome Score 13/50 (24)       Insurance: Primary: Payor: UMR /  /  / ,   Secondary:    Authorization Information: PRE CERTIFICATION REQUIRED: No precert required.  INSURANCE THERAPY BENEFIT: Allowed 60 visits of OT, PT, and ST COMBINED per calendar year.  0 visits have been used.. Hard Max..   AQUATIC THERAPY COVERED: Yes  MODALITIES COVERED:  Yes   Approved Procedure Codes: Authorization of Specific CPT Codes Not Required  (Codes requested indicated by red font, codes approved indicated by black font)   Visit # 4, 4/10 for progress note (Reporting Period: 24 to  )   Visits Allowed: 60 visits   Recertification Date: 10/18/24   Physician Follow-Up:    Physician Orders:    History of Present Illness: Patient reports that she started having neck pain about 25 years ago. Patient did PT in the past and believes it was helpful. Patient reports that neck pain flared back up about 2 months ago. Patient reports that her pain will radiate from her neck down her shoulders and also up into her head at times B L > R. Patient reports that she  clothing/dentures yes

## 2024-09-04 ENCOUNTER — PATIENT MESSAGE (OUTPATIENT)
Dept: PHYSICAL MEDICINE AND REHAB | Age: 54
End: 2024-09-04

## 2024-09-05 ENCOUNTER — HOSPITAL ENCOUNTER (OUTPATIENT)
Dept: PHYSICAL THERAPY | Age: 54
Setting detail: THERAPIES SERIES
Discharge: HOME | End: 2024-09-05
Payer: COMMERCIAL

## 2024-09-05 PROCEDURE — 97110 THERAPEUTIC EXERCISES: CPT

## 2024-09-05 NOTE — PROGRESS NOTES
well  []  Patient limited by fatigue  []  Patient limited by pain   []  Patient limited by medical complications  []  Other:     Assessment: Patient progressed exercises and increased reps today. Patient education on cervical isometric with extension exercise. Patient education with postural awareness. Patient ran through exercises/ stretches fairly quick today. She notes that wall slides and AAROM with dowel hanan exercise are good workouts for her shoulders. Patient noted a 1.5-2/10 cervical pain post treatment. Plan to progress to do more UE strengthening next treatment.       GOALS:  Patient Goal: to improve neck pain     Short Term Goals: 4 weeks  Patient will report decrease in pain to 5-6/10 at most to allow ease of ADLs and household tasks.  2. Patient will improve B cervical lateral flexion AROM to 35-40 degrees to allow decreased pain with household tasks.  3. Patient will improve B cervical rotation AROM to 60-75 degrees to allow decreased pain with turning head during driving.    Long Term Goals: 8 weeks  Patient will improve Neck Disability Index Score from 13/50 to 3/50 to allow decreased disability and improved functional mobility.   2. Patient will be I with HEP to reduce risk of re-injury and improve mobility.   3. Patient will improve B shoulder flexion AROM from 142-150 degrees to 160 degrees to allow decreased pain with reaching overhead.  4. Patient will improved upright posture with patient able to sit upright with minimal to no cues to improve pain with sitting while driving during work.     Patient Education:   [x]  HEP/Education Completed: Continue with HEP, updated handouts, patient education on postural awareness  [employer] Access Code: [email or web address]  []  No new Education completed  []  Reviewed Prior HEP      [x]  Patient verbalized and/or demonstrated understanding of education provided.  []  Patient unable to verbalize and/or demonstrate understanding of education provided.  Will continue

## 2024-09-12 ENCOUNTER — APPOINTMENT (OUTPATIENT)
Dept: PHYSICAL THERAPY | Age: 54
End: 2024-09-12
Payer: COMMERCIAL

## 2024-09-17 ENCOUNTER — HOSPITAL ENCOUNTER (OUTPATIENT)
Dept: PHYSICAL THERAPY | Age: 54
Setting detail: THERAPIES SERIES
Discharge: HOME | End: 2024-09-17
Payer: COMMERCIAL

## 2024-09-17 PROCEDURE — 97110 THERAPEUTIC EXERCISES: CPT

## 2024-09-23 ENCOUNTER — OFFICE VISIT (OUTPATIENT)
Dept: PHYSICAL MEDICINE AND REHAB | Age: 54
End: 2024-09-23
Payer: COMMERCIAL

## 2024-09-23 VITALS
SYSTOLIC BLOOD PRESSURE: 128 MMHG | DIASTOLIC BLOOD PRESSURE: 80 MMHG | WEIGHT: 166 LBS | BODY MASS INDEX: 26.06 KG/M2 | HEIGHT: 67 IN

## 2024-09-23 DIAGNOSIS — G89.4 CHRONIC PAIN SYNDROME: ICD-10-CM

## 2024-09-23 DIAGNOSIS — M47.812 CERVICAL SPONDYLOSIS: ICD-10-CM

## 2024-09-23 DIAGNOSIS — M48.02 CERVICAL STENOSIS OF SPINAL CANAL: ICD-10-CM

## 2024-09-23 DIAGNOSIS — M47.812 FACET HYPERTROPHY OF CERVICAL REGION: ICD-10-CM

## 2024-09-23 DIAGNOSIS — M79.18 MYOFASCIAL PAIN: ICD-10-CM

## 2024-09-23 DIAGNOSIS — M54.12 CERVICAL RADICULOPATHY: ICD-10-CM

## 2024-09-23 PROCEDURE — 99214 OFFICE O/P EST MOD 30 MIN: CPT | Performed by: NURSE PRACTITIONER

## 2024-09-23 RX ORDER — PREGABALIN 50 MG/1
50 CAPSULE ORAL NIGHTLY
Qty: 30 CAPSULE | Refills: 2 | Status: SHIPPED | OUTPATIENT
Start: 2024-09-23 | End: 2024-12-22

## 2024-09-24 ENCOUNTER — HOSPITAL ENCOUNTER (OUTPATIENT)
Dept: PHYSICAL THERAPY | Age: 54
Setting detail: THERAPIES SERIES
Discharge: HOME | End: 2024-09-24
Payer: COMMERCIAL

## 2024-09-24 PROCEDURE — 97110 THERAPEUTIC EXERCISES: CPT

## 2024-09-30 ENCOUNTER — OFFICE VISIT (OUTPATIENT)
Dept: FAMILY MEDICINE CLINIC | Age: 54
End: 2024-09-30
Payer: COMMERCIAL

## 2024-09-30 VITALS
HEIGHT: 67 IN | TEMPERATURE: 98.6 F | OXYGEN SATURATION: 97 % | SYSTOLIC BLOOD PRESSURE: 126 MMHG | HEART RATE: 93 BPM | RESPIRATION RATE: 16 BRPM | DIASTOLIC BLOOD PRESSURE: 78 MMHG | BODY MASS INDEX: 26.09 KG/M2 | WEIGHT: 166.2 LBS

## 2024-09-30 DIAGNOSIS — Z00.01 ENCOUNTER FOR WELL ADULT EXAM WITH ABNORMAL FINDINGS: Primary | ICD-10-CM

## 2024-09-30 DIAGNOSIS — J30.89 NON-SEASONAL ALLERGIC RHINITIS DUE TO OTHER ALLERGIC TRIGGER: ICD-10-CM

## 2024-09-30 DIAGNOSIS — J31.0 CHRONIC RHINITIS: ICD-10-CM

## 2024-09-30 DIAGNOSIS — Z12.31 ENCOUNTER FOR SCREENING MAMMOGRAM FOR MALIGNANT NEOPLASM OF BREAST: ICD-10-CM

## 2024-09-30 DIAGNOSIS — R09.81 NASAL CONGESTION: ICD-10-CM

## 2024-09-30 DIAGNOSIS — E04.1 THYROID NODULE: ICD-10-CM

## 2024-09-30 PROBLEM — F43.23 ADJUSTMENT DISORDER WITH MIXED ANXIETY AND DEPRESSED MOOD: Status: RESOLVED | Noted: 2017-03-20 | Resolved: 2024-09-30

## 2024-09-30 PROCEDURE — 99213 OFFICE O/P EST LOW 20 MIN: CPT | Performed by: NURSE PRACTITIONER

## 2024-09-30 PROCEDURE — 99396 PREV VISIT EST AGE 40-64: CPT | Performed by: NURSE PRACTITIONER

## 2024-09-30 RX ORDER — TRIAMCINOLONE ACETONIDE 55 UG/1
2 SPRAY, METERED NASAL DAILY
Qty: 1 EACH | Refills: 3 | Status: SHIPPED | OUTPATIENT
Start: 2024-09-30

## 2024-09-30 RX ORDER — PREDNISONE 20 MG/1
20 TABLET ORAL 2 TIMES DAILY
Qty: 10 TABLET | Refills: 0 | Status: SHIPPED | OUTPATIENT
Start: 2024-09-30 | End: 2024-10-05

## 2024-09-30 SDOH — ECONOMIC STABILITY: FOOD INSECURITY: WITHIN THE PAST 12 MONTHS, YOU WORRIED THAT YOUR FOOD WOULD RUN OUT BEFORE YOU GOT MONEY TO BUY MORE.: NEVER TRUE

## 2024-09-30 SDOH — ECONOMIC STABILITY: FOOD INSECURITY: WITHIN THE PAST 12 MONTHS, THE FOOD YOU BOUGHT JUST DIDN'T LAST AND YOU DIDN'T HAVE MONEY TO GET MORE.: NEVER TRUE

## 2024-09-30 SDOH — ECONOMIC STABILITY: INCOME INSECURITY: HOW HARD IS IT FOR YOU TO PAY FOR THE VERY BASICS LIKE FOOD, HOUSING, MEDICAL CARE, AND HEATING?: NOT HARD AT ALL

## 2024-09-30 ASSESSMENT — ENCOUNTER SYMPTOMS
CHOKING: 0
STRIDOR: 0
COUGH: 1
SHORTNESS OF BREATH: 0
CHEST TIGHTNESS: 0
GASTROINTESTINAL NEGATIVE: 1
WHEEZING: 0

## 2024-09-30 NOTE — PROGRESS NOTES
PFIZER PURPLE top, DILUTE for use, (age 12 y+), 30mcg/0.3mL 03/29/2021, 04/19/2021, 01/03/2022    Influenza Virus Vaccine 12/10/2010, 01/05/2015, 10/04/2017, 10/02/2018, 10/22/2019, 10/19/2020    Influenza, FLUZONE High Dose, (age 65 y+), IM, Trivalent PF, 0.5mL 10/14/2011    TDaP, ADACEL (age 10y-64y), BOOSTRIX (age 10y+), IM, 0.5mL 01/14/2021    Zoster Recombinant (Shingrix) 09/27/2022        Health Maintenance Due   Topic Date Due    Hepatitis B vaccine (1 of 3 - 19+ 3-dose series) Never done    Breast cancer screen  10/25/2022    Shingles vaccine (2 of 2) 11/22/2022    Flu vaccine (1) 08/01/2024    COVID-19 Vaccine (4 - 2023-24 season) 09/01/2024     Recommendations for Preventive Services Due: see orders and patient instructions/AVS.

## 2024-10-16 ENCOUNTER — PATIENT MESSAGE (OUTPATIENT)
Dept: PHYSICAL MEDICINE AND REHAB | Age: 54
End: 2024-10-16

## 2024-10-17 ENCOUNTER — LAB (OUTPATIENT)
Dept: LAB | Age: 54
End: 2024-10-17

## 2024-10-17 DIAGNOSIS — Z00.01 ENCOUNTER FOR WELL ADULT EXAM WITH ABNORMAL FINDINGS: ICD-10-CM

## 2024-10-17 LAB
ALBUMIN SERPL BCG-MCNC: 4.3 G/DL (ref 3.5–5.1)
ALP SERPL-CCNC: 54 U/L (ref 38–126)
ALT SERPL W/O P-5'-P-CCNC: 31 U/L (ref 11–66)
ANION GAP SERPL CALC-SCNC: 16 MEQ/L (ref 8–16)
AST SERPL-CCNC: 23 U/L (ref 5–40)
BASOPHILS ABSOLUTE: 0 THOU/MM3 (ref 0–0.1)
BASOPHILS NFR BLD AUTO: 0.5 %
BILIRUB SERPL-MCNC: 0.8 MG/DL (ref 0.3–1.2)
BUN SERPL-MCNC: 11 MG/DL (ref 7–22)
CALCIUM SERPL-MCNC: 9.1 MG/DL (ref 8.5–10.5)
CHLORIDE SERPL-SCNC: 103 MEQ/L (ref 98–111)
CHOLEST SERPL-MCNC: 193 MG/DL (ref 100–199)
CO2 SERPL-SCNC: 24 MEQ/L (ref 23–33)
CREAT SERPL-MCNC: 0.5 MG/DL (ref 0.4–1.2)
DEPRECATED RDW RBC AUTO: 46.8 FL (ref 35–45)
EOSINOPHIL NFR BLD AUTO: 2.4 %
EOSINOPHILS ABSOLUTE: 0.2 THOU/MM3 (ref 0–0.4)
ERYTHROCYTE [DISTWIDTH] IN BLOOD BY AUTOMATED COUNT: 13.6 % (ref 11.5–14.5)
GFR SERPL CREATININE-BSD FRML MDRD: > 90 ML/MIN/1.73M2
GLUCOSE SERPL-MCNC: 87 MG/DL (ref 70–108)
HCT VFR BLD AUTO: 38.9 % (ref 37–47)
HDLC SERPL-MCNC: 47 MG/DL
HGB BLD-MCNC: 12.7 GM/DL (ref 12–16)
IMM GRANULOCYTES # BLD AUTO: 0.01 THOU/MM3 (ref 0–0.07)
IMM GRANULOCYTES NFR BLD AUTO: 0.2 %
LDLC SERPL CALC-MCNC: 118 MG/DL
LYMPHOCYTES ABSOLUTE: 1.8 THOU/MM3 (ref 1–4.8)
LYMPHOCYTES NFR BLD AUTO: 29.2 %
MCH RBC QN AUTO: 30.2 PG (ref 26–33)
MCHC RBC AUTO-ENTMCNC: 32.6 GM/DL (ref 32.2–35.5)
MCV RBC AUTO: 92.6 FL (ref 81–99)
MONOCYTES ABSOLUTE: 0.7 THOU/MM3 (ref 0.4–1.3)
MONOCYTES NFR BLD AUTO: 11.9 %
NEUTROPHILS ABSOLUTE: 3.5 THOU/MM3 (ref 1.8–7.7)
NEUTROPHILS NFR BLD AUTO: 55.8 %
NRBC BLD AUTO-RTO: 0 /100 WBC
PLATELET # BLD AUTO: 296 THOU/MM3 (ref 130–400)
PMV BLD AUTO: 9.4 FL (ref 9.4–12.4)
POTASSIUM SERPL-SCNC: 3.8 MEQ/L (ref 3.5–5.2)
PROT SERPL-MCNC: 7.2 G/DL (ref 6.1–8)
RBC # BLD AUTO: 4.2 MILL/MM3 (ref 4.2–5.4)
SODIUM SERPL-SCNC: 143 MEQ/L (ref 135–145)
TRIGL SERPL-MCNC: 138 MG/DL (ref 0–199)
TSH SERPL DL<=0.005 MIU/L-ACNC: 1.24 UIU/ML (ref 0.4–4.2)
WBC # BLD AUTO: 6.3 THOU/MM3 (ref 4.8–10.8)

## 2024-10-17 NOTE — TELEPHONE ENCOUNTER
I will not complete FMLA for unlimited frequency, we only do this for day of the procedure, day after and her follow-up appointments.    I also will not write a note for her being off all taking care of her  and having increased pain, she did not notify the office, and we could have brought her in for Toradol injection, which we can do depending on pain, to help with flareups of pain.    Please let patient know we will only do FMLA for limited amount of time such as above, and if she would like for us to fill it out that way, we can, if we do continue with any office visits or if we start pursuing injection therapy.

## 2024-10-17 NOTE — TELEPHONE ENCOUNTER
Patient informed.  She will be in to  the papers.  Will discuss with Aster about how to refund her payment.  Papers are in bottom drawer by Kady in the patient  folder.

## 2024-10-18 ENCOUNTER — TELEPHONE (OUTPATIENT)
Dept: FAMILY MEDICINE CLINIC | Age: 54
End: 2024-10-18

## 2024-10-18 NOTE — TELEPHONE ENCOUNTER
----- Message from BLAIR Hermosillo - CNP sent at 10/17/2024  5:25 PM EDT -----  Let pt know labs are stable and in appropriate range

## 2024-11-05 ENCOUNTER — HOSPITAL ENCOUNTER (OUTPATIENT)
Dept: WOMENS IMAGING | Age: 54
Discharge: HOME | End: 2024-11-05
Payer: COMMERCIAL

## 2024-11-05 DIAGNOSIS — Z12.31 ENCOUNTER FOR SCREENING MAMMOGRAM FOR MALIGNANT NEOPLASM OF BREAST: ICD-10-CM

## 2024-11-05 PROCEDURE — 77063 BREAST TOMOSYNTHESIS BI: CPT

## 2024-11-06 ENCOUNTER — TELEPHONE (OUTPATIENT)
Dept: FAMILY MEDICINE CLINIC | Age: 54
End: 2024-11-06

## 2024-11-06 NOTE — TELEPHONE ENCOUNTER
----- Message from BLAIR Marie CNP sent at 11/6/2024 10:01 AM EST -----  Let Vanessa know  her mammogram did show some left breast asymmetry, rec'd additional imaging. [facility] about doing the additional [facility]. She also does have very dense breast tissue, and she could do additional imaging, a whole breast automated ultrasound (ABUS) for additional screening, if she so desires. This is optional. I'll leave it up to her.

## 2024-11-06 NOTE — TELEPHONE ENCOUNTER
Patient informed and verbalized understanding.  Patient will call  back after calling her insurance to let us know if she wants the ABUS ordered

## 2024-11-12 ENCOUNTER — HOSPITAL ENCOUNTER (OUTPATIENT)
Dept: WOMENS IMAGING | Age: 54
Discharge: HOME | End: 2024-11-12
Attending: RADIOLOGY
Payer: COMMERCIAL

## 2024-11-12 DIAGNOSIS — R92.8 ABNORMAL MAMMOGRAM: ICD-10-CM

## 2024-11-12 PROCEDURE — G0279 TOMOSYNTHESIS, MAMMO: HCPCS

## 2024-11-12 PROCEDURE — 76642 ULTRASOUND BREAST LIMITED: CPT

## 2024-11-13 DIAGNOSIS — Z09 FOLLOW-UP EXAM: Primary | ICD-10-CM

## 2024-11-13 DIAGNOSIS — R92.8 ABNORMAL MAMMOGRAM: ICD-10-CM

## 2024-11-19 ENCOUNTER — OFFICE VISIT (OUTPATIENT)
Dept: PHYSICAL MEDICINE AND REHAB | Age: 54
End: 2024-11-19
Payer: COMMERCIAL

## 2024-11-19 VITALS
BODY MASS INDEX: 26.09 KG/M2 | WEIGHT: 166.23 LBS | DIASTOLIC BLOOD PRESSURE: 72 MMHG | SYSTOLIC BLOOD PRESSURE: 120 MMHG | HEIGHT: 67 IN

## 2024-11-19 DIAGNOSIS — M79.18 MYOFASCIAL PAIN: ICD-10-CM

## 2024-11-19 DIAGNOSIS — G89.29 CHRONIC BILATERAL LOW BACK PAIN WITHOUT SCIATICA: Primary | ICD-10-CM

## 2024-11-19 DIAGNOSIS — M47.812 CERVICAL SPONDYLOSIS: ICD-10-CM

## 2024-11-19 DIAGNOSIS — M48.02 CERVICAL STENOSIS OF SPINAL CANAL: ICD-10-CM

## 2024-11-19 DIAGNOSIS — M54.12 CERVICAL RADICULOPATHY: ICD-10-CM

## 2024-11-19 DIAGNOSIS — M54.50 CHRONIC BILATERAL LOW BACK PAIN WITHOUT SCIATICA: Primary | ICD-10-CM

## 2024-11-19 DIAGNOSIS — M47.812 FACET HYPERTROPHY OF CERVICAL REGION: ICD-10-CM

## 2024-11-19 DIAGNOSIS — G89.4 CHRONIC PAIN SYNDROME: ICD-10-CM

## 2024-11-19 PROCEDURE — 99214 OFFICE O/P EST MOD 30 MIN: CPT | Performed by: NURSE PRACTITIONER

## 2024-11-19 RX ORDER — PREGABALIN 75 MG/1
75 CAPSULE ORAL NIGHTLY
Qty: 30 CAPSULE | Refills: 0 | Status: SHIPPED | OUTPATIENT
Start: 2024-11-19 | End: 2024-12-19

## 2024-11-19 NOTE — PROGRESS NOTES
[facility] PROFESSIONAL SERVS  [facility] NEUROSCIENCE AND REHABILITATION [address]  [facility] [address]  Dept: [phone]  Dept Fax: [phone]  Loc: [phone]    Visit Date: 11/19/2024    Functionality Assessment/Goals Worksheet     On a scale of 0 (Does not Interfere) to 10 (Completely Interferes)     1.  Which number describes how during the past week pain has interfered with       the following:  A.  General Activity:  5  B.  Mood: 4  C.  Walking Ability:  0  D.  Normal Work (Includes both work outside the home and housework):  4  E.  Relations with Other People:   3  F.  Sleep:   4  G.  Enjoyment of Life:   4    2.  Patient Prefers to Take their Pain Medications:     [x]  On a regular basis   []  Only when necessary    []  Does not take pain medications    3.  What are the Patient's Goals/Expectations for Visiting Pain Management?     [x]  Learn about my pain    []  Receive Medication   []  Physical Therapy     []  Treat Depression   []  Receive Injections    []  Treat Sleep   []  Deal with Anxiety and Stress   []  Treat Opoid Dependence/Addiction   []  Other:  
primary, and the Flexeril for me as well.  She is educated to call when she needs refills.  She does not have any imaging on her low back, we did discuss starting with a lumbar x-ray, she is agreeable this and have ordered here to be completed at [facility].  She did not this any time prior to being seen again.  I have also printed off and encouraged her, directed her on exercises to help assist with her low back pain.  I would like to see her back in 4 weeks to reevaluate.    Plan:   The following treatment recommendations and plan were discussed in detail with Vanessa Palmer.    Imaging:   I have reviewed patient’s imaging of cervical XR, cervical MRI and results were discussed with patient today.     Analgesics:   The patient is currently managing their pain with the use of naproxen which is prescribed by PCP.     We recommend that the patient follow the recommendation of the prescribing provider with regard to the above medication(s) and contact their prescribing provider for refills if needed.   The side effect profile of long-term opioid use was discussed and recommended emphasis on multimodal strategies for pain management.     Patient is taking Acetaminophen. Patient informed that the maximum amount of acetaminophen taken on a regular basis should only be 4000 mg per day.     Patient is taking naproxen. Patient is advised to take as prescribed and not take on an empty stomach.       Adjuvants:   In light of the presence of a neuropathic and myofascial component of pain the patient is advised to start Lyrica 75 mg at night    I have taken over her Flexeril 5 mg nightly as needed for pain/spasms.    Patient is advised to take the medication as prescribed as taking more than prescribed can lead to the development of tolerance, physical dependency, and addiction.  Patient is advised to store and lock the medication in a safe and secure location.  If the medication is lost or stolen we will not provide early

## 2024-11-20 ENCOUNTER — HOSPITAL ENCOUNTER (OUTPATIENT)
Dept: WOMENS IMAGING | Age: 54
Discharge: HOME | End: 2024-11-20
Attending: RADIOLOGY
Payer: COMMERCIAL

## 2024-11-20 ENCOUNTER — HOSPITAL ENCOUNTER (OUTPATIENT)
Dept: WOMENS IMAGING | Age: 54
Discharge: HOME | End: 2024-11-20
Payer: COMMERCIAL

## 2024-11-20 VITALS — HEIGHT: 67 IN | WEIGHT: 166 LBS | BODY MASS INDEX: 26.06 KG/M2

## 2024-11-20 DIAGNOSIS — R59.0 ENLARGED LYMPH NODES IN ARMPIT: ICD-10-CM

## 2024-11-20 DIAGNOSIS — R59.0 ENLARGED LYMPH NODES IN ARMPIT: Primary | ICD-10-CM

## 2024-11-20 DIAGNOSIS — N63.25 MASS OVERLAPPING MULTIPLE QUADRANTS OF LEFT BREAST: ICD-10-CM

## 2024-11-20 DIAGNOSIS — R59.9 ENLARGED LYMPH NODE: ICD-10-CM

## 2024-11-20 DIAGNOSIS — Z09 FOLLOW-UP EXAM: ICD-10-CM

## 2024-11-20 PROCEDURE — 77065 DX MAMMO INCL CAD UNI: CPT

## 2024-11-20 PROCEDURE — 88305 TISSUE EXAM BY PATHOLOGIST: CPT

## 2024-11-20 PROCEDURE — 19083 BX BREAST 1ST LESION US IMAG: CPT

## 2024-11-20 NOTE — PROGRESS NOTES
Breast Biopsy Flowsheet/Post-Operative Care    Date of Procedure: 11/20/2024  Physician: Dr. Sepulveda  Technologist: Stacy Villalba RT(R)(M)    Biopsy:ultrasound guided breast biopsy  Lesion type: Non-palpable  Breast: left    Clock face position: Site #1: 12:00         Primary Method of Detection: Ultrasound      Microcalcification's: no   Distribution: N/A        Biopsy Method:   Sertera:    Site # 1    Gauge: 14    # of Passes: 5     Clip: Parminder      Pre-Op Assessment: (BI-RADS)   4. Suspicious Abnormality    Patient Tolerated Procedure: good  Complications: n/a  Comments: n/a    Post Operative Care  Steri strips: Yes  Dressing: Gauze, Tape   Ice Applied to Site:  No  Evidence of Bleeding:  No    Pain Verbalized: No      Written Discharge Instructions: Yes  Condition at Discharge: good  Time of Discharge: 1003    Electronically signed by Vale Queen on 11/20/2024 at 10:06 AM

## 2024-11-20 NOTE — PROGRESS NOTES
[facility]  Pre-Biopsy Assessment      Patient Education    Written information about procedure Yes  left   Procedural steps explained Yes Ultrasound Biopsy   Post-op potential: bruising, hematoma, pain Yes    Self-care: activity, care of dressing Yes    Patient verbalized understanding Yes    Consent signed and witnessed Yes      Hormone Therapy Status: n/a    Recent Medication: N/A Last Dose: n/a                                     Hormone Replacement Therapy: no    Previous Breast Biopsy: yes -     Previous Diagnosis Cancer: no    Hysterectomy:yes -     Emotional Status: Calm    Language or Physical Barriers: n/a    Comments: n/a      Electronically signed by Vale Queen on 11/20/2024 at 9:15 AM

## 2024-11-21 ENCOUNTER — CLINICAL DOCUMENTATION (OUTPATIENT)
Dept: WOMENS IMAGING | Age: 54
End: 2024-11-21

## 2024-11-21 DIAGNOSIS — Z09 FOLLOW-UP EXAM: Primary | ICD-10-CM

## 2024-11-21 DIAGNOSIS — N63.25 MASS OVERLAPPING MULTIPLE QUADRANTS OF LEFT BREAST: ICD-10-CM

## 2024-11-21 NOTE — PROGRESS NOTES
Contact Type :    Telephone  Notes :  After consulting with Dr. Bryan,  Vanessa was contacted by telephone.  She was informed of the negative biopsy results and the need to return for a 6 month follow up.  She voiced understanding.    Biopsy site: doing well     Results faxed to: CORDELIA Hay

## 2024-12-16 ENCOUNTER — HOSPITAL ENCOUNTER (OUTPATIENT)
Dept: GENERAL RADIOLOGY | Age: 54
Discharge: HOME | End: 2024-12-16
Payer: COMMERCIAL

## 2024-12-16 ENCOUNTER — HOSPITAL ENCOUNTER (OUTPATIENT)
Age: 54
Discharge: HOME | End: 2024-12-16
Payer: COMMERCIAL

## 2024-12-16 DIAGNOSIS — M54.50 CHRONIC BILATERAL LOW BACK PAIN WITHOUT SCIATICA: ICD-10-CM

## 2024-12-16 DIAGNOSIS — G89.29 CHRONIC BILATERAL LOW BACK PAIN WITHOUT SCIATICA: ICD-10-CM

## 2024-12-16 PROCEDURE — 72100 X-RAY EXAM L-S SPINE 2/3 VWS: CPT

## 2024-12-17 ENCOUNTER — OFFICE VISIT (OUTPATIENT)
Dept: PHYSICAL MEDICINE AND REHAB | Age: 54
End: 2024-12-17
Payer: COMMERCIAL

## 2024-12-17 VITALS
DIASTOLIC BLOOD PRESSURE: 80 MMHG | HEIGHT: 67 IN | SYSTOLIC BLOOD PRESSURE: 126 MMHG | WEIGHT: 166.01 LBS | BODY MASS INDEX: 26.06 KG/M2

## 2024-12-17 DIAGNOSIS — R09.81 NASAL CONGESTION: ICD-10-CM

## 2024-12-17 DIAGNOSIS — M79.18 MYOFASCIAL PAIN: ICD-10-CM

## 2024-12-17 DIAGNOSIS — G89.4 CHRONIC PAIN SYNDROME: ICD-10-CM

## 2024-12-17 DIAGNOSIS — J31.0 CHRONIC RHINITIS: ICD-10-CM

## 2024-12-17 DIAGNOSIS — M47.812 FACET HYPERTROPHY OF CERVICAL REGION: ICD-10-CM

## 2024-12-17 DIAGNOSIS — G89.29 CHRONIC BILATERAL LOW BACK PAIN WITHOUT SCIATICA: ICD-10-CM

## 2024-12-17 DIAGNOSIS — M47.812 CERVICAL SPONDYLOSIS: ICD-10-CM

## 2024-12-17 DIAGNOSIS — M54.50 CHRONIC BILATERAL LOW BACK PAIN WITHOUT SCIATICA: ICD-10-CM

## 2024-12-17 DIAGNOSIS — M48.02 CERVICAL STENOSIS OF SPINAL CANAL: ICD-10-CM

## 2024-12-17 DIAGNOSIS — M54.2 CERVICALGIA: ICD-10-CM

## 2024-12-17 DIAGNOSIS — M54.12 CERVICAL RADICULOPATHY: ICD-10-CM

## 2024-12-17 PROCEDURE — 99214 OFFICE O/P EST MOD 30 MIN: CPT | Performed by: NURSE PRACTITIONER

## 2024-12-17 RX ORDER — PREGABALIN 75 MG/1
75 CAPSULE ORAL NIGHTLY
Qty: 90 CAPSULE | Refills: 0 | Status: SHIPPED | OUTPATIENT
Start: 2024-12-17 | End: 2025-03-17

## 2024-12-17 RX ORDER — NAPROXEN 500 MG/1
500 TABLET ORAL 2 TIMES DAILY WITH MEALS
Qty: 180 TABLET | Refills: 0 | Status: SHIPPED | OUTPATIENT
Start: 2024-12-17 | End: 2025-03-17

## 2024-12-17 NOTE — PROGRESS NOTES
Chronic Pain/PM&R Clinic Note     Encounter Date: 12/17/24    Subjective:   Chief Complaint:   Chief Complaint   Patient presents with    Follow-up     4 week f/u       History of Present Illness:   Vanessa Palmer is a 54 y.o. female seen in the clinic initially on 08/07/2024 upon request from BLAIR Eldridge for her history of neck pain. Patient has a personal medical history of rhinitis, anxiety, depression.    Patient presents today with complaints of bilateral neck pain that radiates down between her shoulder blades, across both tops of her shoulders, and down both arms to her fingertips.  She states the left side is worse than right but they are both becoming more bothersome.  She states this pain has been occurring for about 25 years, when she fell asleep on a couch and woke up with a very stiff necks.  She states she ended up seeing a provider, and getting some therapy completed and they told her she had a pinched nerve starting then.  She states that it felt a little bit better after well, but then has worsened again in the last 2 to 3 months.  She denies any injury that caused the pain worsened recently.  She describes the pain as a numbness, aching, tingling, sharp, shooting pain.  She states it varies in what it feels like depend on what she is doing.  She states pain is worse with lifting, pushing, pulling, turning her head, lying down, working, activities around the house.  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  She states pain is somewhat better with resting, certain positions, sitting straight up, heat.  She states the symptoms help but do not last.  She states she last did formal physical therapy 25 years ago, which helped some at the time but she is unsure.  She states she has not been doing any home exercise program.  She states that she has been utilizing Voltaren gel with some relief, and her primary has her on naproxen and Flexeril.

## 2024-12-17 NOTE — PROGRESS NOTES
[facility] PROFESSIONAL SERVS  [facility] NEUROSCIENCE AND REHABILITATION [address]  [facility] [address]  Dept: [phone]  Dept Fax: [phone]  Loc: [phone]    Visit Date: 12/17/2024    Functionality Assessment/Goals Worksheet     On a scale of 0 (Does not Interfere) to 10 (Completely Interferes)     1.  Which number describes how during the past week pain has interfered with       the following:  A.  General Activity:  5  B.  Mood: 5  C.  Walking Ability:  0  D.  Normal Work (Includes both work outside the home and housework):  0  E.  Relations with Other People:   4  F.  Sleep:   5  G.  Enjoyment of Life:   2    2.  Patient Prefers to Take their Pain Medications:     [x]  On a regular basis   []  Only when necessary    []  Does not take pain medications    3.  What are the Patient's Goals/Expectations for Visiting Pain Management?     [x]  Learn about my pain    []  Receive Medication   []  Physical Therapy     []  Treat Depression   []  Receive Injections    []  Treat Sleep   []  Deal with Anxiety and Stress   []  Treat Opoid Dependence/Addiction   []  Other:

## 2024-12-18 RX ORDER — LEVOCETIRIZINE DIHYDROCHLORIDE 5 MG/1
5 TABLET, FILM COATED ORAL NIGHTLY
Qty: 90 TABLET | Refills: 3 | Status: SHIPPED | OUTPATIENT
Start: 2024-12-18

## 2024-12-18 NOTE — TELEPHONE ENCOUNTER
Recent Visits  Date Type Provider Dept   09/30/24 Office Visit Hay, Jan, APRN - CNP [facility]   06/05/24 Office Visit Hay, Jan, APRN - CNP [facility]   09/28/23 Office Visit Hay, Jan, APRN - CNP [facility]   08/21/23 Office Visit Hay, Jan, APRN - CNP [facility]   Showing recent visits within past 540 days with a meds authorizing provider and meeting all other requirements  Future Appointments  No visits were found meeting these conditions.  Showing future appointments within next 150 days with a meds authorizing provider and meeting all other requirements

## 2025-03-18 ENCOUNTER — OFFICE VISIT (OUTPATIENT)
Dept: PHYSICAL MEDICINE AND REHAB | Age: 55
End: 2025-03-18
Payer: COMMERCIAL

## 2025-03-18 VITALS
SYSTOLIC BLOOD PRESSURE: 128 MMHG | BODY MASS INDEX: 26.06 KG/M2 | DIASTOLIC BLOOD PRESSURE: 74 MMHG | WEIGHT: 166.01 LBS | HEIGHT: 67 IN

## 2025-03-18 DIAGNOSIS — M48.02 CERVICAL STENOSIS OF SPINAL CANAL: ICD-10-CM

## 2025-03-18 DIAGNOSIS — M54.50 CHRONIC BILATERAL LOW BACK PAIN WITHOUT SCIATICA: ICD-10-CM

## 2025-03-18 DIAGNOSIS — M79.18 MYOFASCIAL PAIN: ICD-10-CM

## 2025-03-18 DIAGNOSIS — G89.4 CHRONIC PAIN SYNDROME: ICD-10-CM

## 2025-03-18 DIAGNOSIS — M54.12 CERVICAL RADICULOPATHY: ICD-10-CM

## 2025-03-18 DIAGNOSIS — M47.812 FACET HYPERTROPHY OF CERVICAL REGION: ICD-10-CM

## 2025-03-18 DIAGNOSIS — M47.812 CERVICAL SPONDYLOSIS: ICD-10-CM

## 2025-03-18 DIAGNOSIS — G89.29 CHRONIC BILATERAL LOW BACK PAIN WITHOUT SCIATICA: ICD-10-CM

## 2025-03-18 PROCEDURE — 99214 OFFICE O/P EST MOD 30 MIN: CPT | Performed by: NURSE PRACTITIONER

## 2025-03-18 RX ORDER — PREGABALIN 75 MG/1
75 CAPSULE ORAL NIGHTLY
Qty: 90 CAPSULE | Refills: 0 | Status: SHIPPED | OUTPATIENT
Start: 2025-03-18 | End: 2025-06-16

## 2025-03-18 NOTE — PROGRESS NOTES
[facility] PROFESSIONAL SERVS  [facility] NEUROSCIENCE AND REHABILITATION [address]  [facility] [address]  Dept: [phone]  Dept Fax: [phone]  Loc: [phone]    Visit Date: 3/18/2025    Functionality Assessment/Goals Worksheet     On a scale of 0 (Does not Interfere) to 10 (Completely Interferes)     1.  Which number describes how during the past week pain has interfered with       the following:  A.  General Activity:  2  B.  Mood: 2  C.  Walking Ability:  2  D.  Normal Work (Includes both work outside the home and housework):  2  E.  Relations with Other People:   1  F.  Sleep:   2  G.  Enjoyment of Life:   2    2.  Patient Prefers to Take their Pain Medications:     [x]  On a regular basis   []  Only when necessary    []  Does not take pain medications    3.  What are the Patient's Goals/Expectations for Visiting Pain Management?     []  Learn about my pain    [x]  Receive Medication   []  Physical Therapy     []  Treat Depression   []  Receive Injections    []  Treat Sleep   []  Deal with Anxiety and Stress   []  Treat Opoid Dependence/Addiction   []  Other:  
emphasized and discussed in great detail. Discussed compliance in plan of care, medications regimen and appointments in order to continue with clinic and ordered interventions.   PHYSICAL THERAPY: Patient is advised to see a physical therapist for gentle stretching exercises and conditioning exercises for management of pain.   The patient was also advised to continue physical therapy and stretching exercises at home and cognitive behavioral and/or group therapy.     Referrals:  None    Prescriptions Written This Visit:   Lyrica 75 mg nightly, 90-day supply      Follow-up:   3 months    It was my pleasure to evaluate Vanessa Palmer today.  I spent over 35 minutes evaluating this patient, reviewing previous notes and images and completing documentation.       BLAIR Molina - CNP   Interventional Pain Management/PM&R   [facility] and Rehabilitation [place]

## 2025-03-26 DIAGNOSIS — R09.81 NASAL CONGESTION: ICD-10-CM

## 2025-03-26 DIAGNOSIS — J31.0 CHRONIC RHINITIS: ICD-10-CM

## 2025-03-27 RX ORDER — LEVOCETIRIZINE DIHYDROCHLORIDE 5 MG/1
5 TABLET, FILM COATED ORAL NIGHTLY
Qty: 90 TABLET | Refills: 3 | Status: SHIPPED | OUTPATIENT
Start: 2025-03-27

## 2025-03-27 RX ORDER — TRIAMCINOLONE ACETONIDE 55 UG/1
2 SPRAY, METERED NASAL DAILY
Qty: 1 EACH | Refills: 3 | Status: SHIPPED | OUTPATIENT
Start: 2025-03-27

## 2025-03-27 NOTE — TELEPHONE ENCOUNTER
Recent Visits  Date Type Provider Dept   09/30/24 Office Visit Steve Hay APRN - CNP [facility]   06/05/24 Office Visit Steve Hay APRN - CNP [facility]   Showing recent visits within past 540 days with a meds authorizing provider and meeting all other requirements  Future Appointments  No visits were found meeting these conditions.  Showing future appointments within next 150 days with a meds authorizing provider and meeting all other requirements

## 2025-04-11 ENCOUNTER — HOSPITAL ENCOUNTER (EMERGENCY)
Age: 55
Discharge: HOME | End: 2025-04-11
Payer: COMMERCIAL

## 2025-04-11 VITALS
SYSTOLIC BLOOD PRESSURE: 144 MMHG | TEMPERATURE: 98.1 F | RESPIRATION RATE: 18 BRPM | OXYGEN SATURATION: 97 % | HEART RATE: 80 BPM | DIASTOLIC BLOOD PRESSURE: 95 MMHG

## 2025-04-11 DIAGNOSIS — J32.4 CHRONIC PANSINUSITIS: Primary | ICD-10-CM

## 2025-04-11 PROCEDURE — 99213 OFFICE O/P EST LOW 20 MIN: CPT

## 2025-04-11 PROCEDURE — 99214 OFFICE O/P EST MOD 30 MIN: CPT

## 2025-04-11 RX ORDER — ACETAMINOPHEN 325 MG/1
650 TABLET ORAL EVERY 6 HOURS PRN
COMMUNITY

## 2025-04-11 ASSESSMENT — ENCOUNTER SYMPTOMS
SHORTNESS OF BREATH: 0
COUGH: 1
TROUBLE SWALLOWING: 0
ABDOMINAL PAIN: 0
CHEST TIGHTNESS: 0
WHEEZING: 0
DIARRHEA: 0
RHINORRHEA: 0
VOMITING: 0
CONSTIPATION: 0
SORE THROAT: 1
NAUSEA: 0
EYE PAIN: 0
SINUS PAIN: 1
SINUS PRESSURE: 1
PHOTOPHOBIA: 0
APNEA: 0
EYE REDNESS: 0
COLOR CHANGE: 0

## 2025-04-11 ASSESSMENT — PAIN DESCRIPTION - LOCATION: LOCATION: HEAD

## 2025-04-11 ASSESSMENT — PAIN DESCRIPTION - DESCRIPTORS: DESCRIPTORS: ACHING

## 2025-04-11 ASSESSMENT — PAIN DESCRIPTION - PAIN TYPE: TYPE: ACUTE PAIN

## 2025-04-11 ASSESSMENT — PAIN - FUNCTIONAL ASSESSMENT
PAIN_FUNCTIONAL_ASSESSMENT: ACTIVITIES ARE NOT PREVENTED
PAIN_FUNCTIONAL_ASSESSMENT: 0-10

## 2025-04-11 ASSESSMENT — PAIN SCALES - GENERAL: PAINLEVEL_OUTOF10: 4

## 2025-04-11 NOTE — DISCHARGE INSTRUCTIONS
I sent in the event as discussed.    Keep using the medications that you have been prescribed and advised to utilize for your chronic sinusitis.    I highly recommend using normal saline nasal spray throughout the day to help break up sinus pressure and congestion.

## 2025-04-11 NOTE — ED PROVIDER NOTES
[facility] URGENT CARE  Urgent Care Encounter       CHIEF COMPLAINT       Chief Complaint   Patient presents with    Pharyngitis    Cough    Nasal Congestion    Ear Pain       Nurses Notes reviewed and I agree except as noted in the HPI.  HISTORY OF PRESENT ILLNESS   Vanessa Palmer is a 54 y.o. female who presents to [facility] urgent care for evaluation of nasal congestion, cough, ear pain and pharyngitis.  Patient has chronic sinusitis in which she has been using her prescribed medications regularly without relief.  Pt denies any SOB, CP, light-headedness or dizziness, numbness or tingling, abd pain, N/V/D, constipation or urinary complaints.  She reports that her symptoms started to worsen approximately 2 to 3 days ago.  Reports that she does get sinus infections and typically does need an antibiotic at some point.      The history is provided by the patient. No  was used.       REVIEW OF SYSTEMS     Review of Systems   Constitutional:  Positive for fatigue. Negative for activity change, appetite change, chills, fever and unexpected weight change.   HENT:  Positive for congestion, ear pain, postnasal drip, sinus pressure, sinus pain, sneezing and sore throat. Negative for hearing loss, mouth sores, nosebleeds, rhinorrhea, tinnitus and trouble swallowing.    Eyes:  Negative for photophobia, pain, redness and visual disturbance.   Respiratory:  Positive for cough. Negative for apnea, chest tightness, shortness of breath and wheezing.    Cardiovascular:  Negative for chest pain and palpitations.   Gastrointestinal:  Negative for abdominal pain, constipation, diarrhea, nausea and vomiting.   Endocrine: Negative for cold intolerance, heat intolerance, polydipsia, polyphagia and polyuria.   Genitourinary:  Negative for difficulty urinating, dysuria, flank pain, frequency, hematuria, menstrual problem and urgency.   Musculoskeletal:  Negative for arthralgias, myalgias, neck pain and neck

## 2025-04-11 NOTE — ED NOTES
To room with c/o cough, nasal congestion, headache, sore throat, and bilateral ear pain that started 2 days ago.      Kosch, Kaity, RN  04/11/25 [id number]

## 2025-05-12 ENCOUNTER — HOSPITAL ENCOUNTER (OUTPATIENT)
Dept: WOMENS IMAGING | Age: 55
Discharge: HOME | End: 2025-05-12
Attending: RADIOLOGY
Payer: COMMERCIAL

## 2025-05-12 DIAGNOSIS — N63.25 MASS OVERLAPPING MULTIPLE QUADRANTS OF LEFT BREAST: ICD-10-CM

## 2025-05-12 DIAGNOSIS — R59.0 ENLARGED LYMPH NODES IN ARMPIT: ICD-10-CM

## 2025-05-12 DIAGNOSIS — R92.8 ABNORMAL MAMMOGRAM: ICD-10-CM

## 2025-05-12 DIAGNOSIS — Z09 FOLLOW-UP EXAM: ICD-10-CM

## 2025-05-12 PROCEDURE — G0279 TOMOSYNTHESIS, MAMMO: HCPCS

## 2025-05-12 PROCEDURE — 76642 ULTRASOUND BREAST LIMITED: CPT

## 2025-05-30 ENCOUNTER — HOSPITAL ENCOUNTER (OUTPATIENT)
Dept: GENERAL RADIOLOGY | Age: 55
Discharge: HOME | End: 2025-05-30
Payer: COMMERCIAL

## 2025-05-30 ENCOUNTER — RESULTS FOLLOW-UP (OUTPATIENT)
Dept: FAMILY MEDICINE CLINIC | Age: 55
End: 2025-05-30

## 2025-05-30 ENCOUNTER — OFFICE VISIT (OUTPATIENT)
Dept: FAMILY MEDICINE CLINIC | Age: 55
End: 2025-05-30
Payer: COMMERCIAL

## 2025-05-30 ENCOUNTER — HOSPITAL ENCOUNTER (OUTPATIENT)
Age: 55
Discharge: HOME | End: 2025-05-30
Payer: COMMERCIAL

## 2025-05-30 VITALS
WEIGHT: 170.8 LBS | BODY MASS INDEX: 26.81 KG/M2 | SYSTOLIC BLOOD PRESSURE: 138 MMHG | DIASTOLIC BLOOD PRESSURE: 82 MMHG | TEMPERATURE: 98 F | OXYGEN SATURATION: 97 % | HEIGHT: 67 IN | RESPIRATION RATE: 14 BRPM | HEART RATE: 92 BPM

## 2025-05-30 DIAGNOSIS — M79.645 CHRONIC PAIN OF LEFT THUMB: ICD-10-CM

## 2025-05-30 DIAGNOSIS — G89.29 CHRONIC PAIN OF LEFT THUMB: ICD-10-CM

## 2025-05-30 DIAGNOSIS — M77.8 TENDONITIS OF WRIST, LEFT: ICD-10-CM

## 2025-05-30 DIAGNOSIS — M79.645 CHRONIC PAIN OF LEFT THUMB: Primary | ICD-10-CM

## 2025-05-30 DIAGNOSIS — M25.532 LEFT WRIST PAIN: ICD-10-CM

## 2025-05-30 DIAGNOSIS — G89.29 CHRONIC PAIN OF LEFT THUMB: Primary | ICD-10-CM

## 2025-05-30 PROCEDURE — 99213 OFFICE O/P EST LOW 20 MIN: CPT | Performed by: NURSE PRACTITIONER

## 2025-05-30 PROCEDURE — 73120 X-RAY EXAM OF HAND: CPT

## 2025-05-30 PROCEDURE — 73110 X-RAY EXAM OF WRIST: CPT

## 2025-05-30 RX ORDER — PREDNISONE 20 MG/1
TABLET ORAL
Qty: 18 TABLET | Refills: 0 | Status: SHIPPED | OUTPATIENT
Start: 2025-05-30

## 2025-05-30 SDOH — ECONOMIC STABILITY: FOOD INSECURITY: WITHIN THE PAST 12 MONTHS, YOU WORRIED THAT YOUR FOOD WOULD RUN OUT BEFORE YOU GOT MONEY TO BUY MORE.: NEVER TRUE

## 2025-05-30 SDOH — ECONOMIC STABILITY: FOOD INSECURITY: WITHIN THE PAST 12 MONTHS, THE FOOD YOU BOUGHT JUST DIDN'T LAST AND YOU DIDN'T HAVE MONEY TO GET MORE.: NEVER TRUE

## 2025-05-30 ASSESSMENT — PATIENT HEALTH QUESTIONNAIRE - PHQ9
SUM OF ALL RESPONSES TO PHQ QUESTIONS 1-9: 0
2. FEELING DOWN, DEPRESSED OR HOPELESS: NOT AT ALL
1. LITTLE INTEREST OR PLEASURE IN DOING THINGS: NOT AT ALL
SUM OF ALL RESPONSES TO PHQ QUESTIONS 1-9: 0

## 2025-05-30 ASSESSMENT — ENCOUNTER SYMPTOMS: RESPIRATORY NEGATIVE: 1

## 2025-05-30 NOTE — PROGRESS NOTES
[facility]  [address]  [place] [address]  Dept: [phone]  Dept Fax: [phone]  Loc: [phone]    Vanessa Palmer is a 54 y.o. femalewho presents today for her medical conditions/complaints as noted below.  Vanessa Palmeris c/o of Follow-up (Left thumb ongoing pain for a month, getting worse)      :     HPI    Pt presents with a 3 week hx of left base of thumb pain that radiates to left dorsal surface of hand to wrist  Swells at times  Denies redness of the area  It aches at times makes it hard to  change diapers and lift things  Denies N/T of left hand suggestive of carpal tunnel syndrome  Does perform repetitive movements at work  Never had pain in her in the past no known injury     Current Outpatient Medications   Medication Sig Dispense Refill    predniSONE (DELTASONE) 20 MG tablet 1 tab po tid for 3 days 1 tab po bid for 3 days 1 tab po once day for 3 days 18 tablet 0    acetaminophen (TYLENOL) 325 MG tablet Take 2 tablets by mouth every 6 hours as needed for Pain      dextromethorphan-guaiFENesin (MUCINEX DM)  MG per extended release tablet Take 1 tablet by mouth every 12 hours as needed      triamcinolone (NASACORT) 55 MCG/ACT nasal inhaler 2 sprays by Each Nostril route daily 1 each 3    levocetirizine (XYZAL) 5 MG tablet Take 1 tablet by mouth nightly 90 tablet 3    pregabalin (LYRICA) 75 MG capsule Take 1 capsule by mouth nightly for 90 days. Max Daily Amount: 75 mg 90 capsule 0    naproxen (NAPROSYN) 500 MG tablet Take 1 tablet by mouth 2 times daily (with meals) 180 tablet 0    diclofenac sodium (VOLTAREN) 1 % GEL Apply topically 2 times daily       No current facility-administered medications for this visit.       Past Medical History:   Diagnosis Date    Headache       Past Surgical History:   Procedure Laterality Date    BRAIN SURGERY      tumor removed      SECTION      x1    HYSTERECTOMY (CERVIX

## 2025-06-18 ENCOUNTER — OFFICE VISIT (OUTPATIENT)
Dept: PHYSICAL MEDICINE AND REHAB | Age: 55
End: 2025-06-18
Payer: COMMERCIAL

## 2025-06-18 VITALS
HEIGHT: 67 IN | SYSTOLIC BLOOD PRESSURE: 128 MMHG | WEIGHT: 170.86 LBS | BODY MASS INDEX: 26.82 KG/M2 | DIASTOLIC BLOOD PRESSURE: 70 MMHG

## 2025-06-18 DIAGNOSIS — G89.29 CHRONIC BILATERAL LOW BACK PAIN WITHOUT SCIATICA: ICD-10-CM

## 2025-06-18 DIAGNOSIS — G89.4 CHRONIC PAIN SYNDROME: ICD-10-CM

## 2025-06-18 DIAGNOSIS — M47.812 CERVICAL SPONDYLOSIS: ICD-10-CM

## 2025-06-18 DIAGNOSIS — M47.812 FACET HYPERTROPHY OF CERVICAL REGION: Primary | ICD-10-CM

## 2025-06-18 DIAGNOSIS — M79.18 MYOFASCIAL PAIN: ICD-10-CM

## 2025-06-18 DIAGNOSIS — M54.2 CERVICALGIA: ICD-10-CM

## 2025-06-18 DIAGNOSIS — M54.50 CHRONIC BILATERAL LOW BACK PAIN WITHOUT SCIATICA: ICD-10-CM

## 2025-06-18 DIAGNOSIS — M47.816 LUMBAR SPONDYLOSIS: ICD-10-CM

## 2025-06-18 DIAGNOSIS — M48.02 CERVICAL STENOSIS OF SPINAL CANAL: ICD-10-CM

## 2025-06-18 DIAGNOSIS — M47.816 FACET HYPERTROPHY OF LUMBAR REGION: ICD-10-CM

## 2025-06-18 DIAGNOSIS — M54.12 CERVICAL RADICULOPATHY: ICD-10-CM

## 2025-06-18 PROCEDURE — 99214 OFFICE O/P EST MOD 30 MIN: CPT | Performed by: NURSE PRACTITIONER

## 2025-06-18 RX ORDER — NAPROXEN 500 MG/1
500 TABLET ORAL 2 TIMES DAILY WITH MEALS
Qty: 60 TABLET | Refills: 2 | Status: SHIPPED | OUTPATIENT
Start: 2025-06-18 | End: 2025-09-16

## 2025-06-18 RX ORDER — PREGABALIN 75 MG/1
75 CAPSULE ORAL NIGHTLY
Qty: 30 CAPSULE | Refills: 2 | Status: SHIPPED | OUTPATIENT
Start: 2025-06-18 | End: 2025-09-16

## 2025-06-18 NOTE — PATIENT INSTRUCTIONS
Learning About Medial Branch Block and Neurotomy  What are medial branch block and neurotomy?     Facet joints connect your vertebrae to each other. Problems in these joints can cause chronic (long-term) pain in the neck or back.  Medial branch nerves are the nerves that carry many of the pain messages from your facet joints.  Radiofrequency medial branch neurotomy is a type of medial branch neurotomy that is used to relieve arthritis pain. It uses radio waves to damage nerves in your neck or back so that they can no longer send pain messages to your brain.  Before your doctor knows if a neurotomy will help you, you will get a medial branch block to find out if certain nerves are the ones that are a source of your pain. You will need two separate visits to the outpatient center or hospital to have both procedures.  You will need someone to drive you home.  How is a medial branch block done?  The doctor will use a tiny needle to numb the skin where you will get the block. Then the doctor puts the block needle into the numbed area. You may feel some pressure, but you should not feel pain. Using fluoroscopy (live X-ray) to guide the needle, the doctor injects medicine onto one or more nerves to make them numb.  If you get relief from your pain in the next 4 to 6 hours, it's a sign that those nerves may be contributing to your pain. The relief will last only a short time. You may then have a medial branch neurotomy at a later visit to try to get longer relief.  How is a medial branch neurotomy done?  The doctor will use a tiny needle to numb the skin where you will get the neurotomy. Then the doctor puts the neurotomy needle into the numbed area. You may feel some pressure. Using fluoroscopy (live X-ray) to guide the needle, the doctor sends radio waves through the needle to the nerve for 60 to 90 seconds. The radio waves heat the nerve, which damages it. The doctor may do this several times. And more than one nerve may

## 2025-06-18 NOTE — PROGRESS NOTES
Chronic Pain/PM&R Clinic Note     Encounter Date: 6/18/25    Subjective:   Chief Complaint:   Chief Complaint   Patient presents with    Follow-up     3 month f/u       History of Present Illness:   Vanessa Palmer is a 55 y.o. female seen in the clinic initially on 08/07/2024 upon request from BLAIR Eldridge for her history of neck pain. Patient has a personal medical history of rhinitis, anxiety, depression.    Patient presents today with complaints of bilateral neck pain that radiates down between her shoulder blades, across both tops of her shoulders, and down both arms to her fingertips.  She states the left side is worse than right but they are both becoming more bothersome.  She states this pain has been occurring for about 25 years, when she fell asleep on a couch and woke up with a very stiff necks.  She states she ended up seeing a provider, and getting some therapy completed and they told her she had a pinched nerve starting then.  She states that it felt a little bit better after well, but then has worsened again in the last 2 to 3 months.  She denies any injury that caused the pain worsened recently.  She describes the pain as a numbness, aching, tingling, sharp, shooting pain.  She states it varies in what it feels like depend on what she is doing.  She states pain is worse with lifting, pushing, pulling, turning her head, lying down, working, activities around the house.  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  She states pain is somewhat better with resting, certain positions, sitting straight up, heat.  She states the symptoms help but do not last.  She states she last did formal physical therapy 25 years ago, which helped some at the time but she is unsure.  She states she has not been doing any home exercise program.  She states that she has been utilizing Voltaren gel with some relief, and her primary has her on naproxen and Flexeril.

## 2025-06-18 NOTE — PROGRESS NOTES
[facility] PROFESSIONAL SERVS  [facility] NEUROSCIENCE AND REHABILITATION [address]  [facility] [address]  Dept: [phone]  Dept Fax: [phone]  Loc: [phone]    Visit Date: 6/18/2025    Functionality Assessment/Goals Worksheet     On a scale of 0 (Does not Interfere) to 10 (Completely Interferes)     1.  Which number describes how during the past week pain has interfered with       the following:  A.  General Activity:  8  B.  Mood: 7  C.  Walking Ability:  5  D.  Normal Work (Includes both work outside the home and housework):  9  E.  Relations with Other People:   2  F.  Sleep:   5  G.  Enjoyment of Life:   2    2.  Patient Prefers to Take their Pain Medications:     [x]  On a regular basis   []  Only when necessary    []  Does not take pain medications    3.  What are the Patient's Goals/Expectations for Visiting Pain Management?     []  Learn about my pain    [x]  Receive Medication   []  Physical Therapy     []  Treat Depression   []  Receive Injections    []  Treat Sleep   []  Deal with Anxiety and Stress   []  Treat Opoid Dependence/Addiction   []  Other:

## 2025-06-26 ENCOUNTER — OFFICE VISIT (OUTPATIENT)
Dept: FAMILY MEDICINE CLINIC | Age: 55
End: 2025-06-26
Payer: COMMERCIAL

## 2025-06-26 VITALS
SYSTOLIC BLOOD PRESSURE: 126 MMHG | RESPIRATION RATE: 16 BRPM | OXYGEN SATURATION: 98 % | WEIGHT: 171.4 LBS | BODY MASS INDEX: 26.9 KG/M2 | TEMPERATURE: 97.9 F | HEIGHT: 67 IN | HEART RATE: 88 BPM | DIASTOLIC BLOOD PRESSURE: 78 MMHG

## 2025-06-26 DIAGNOSIS — M25.542 METACARPOPHALANGEAL JOINT PAIN OF LEFT HAND: ICD-10-CM

## 2025-06-26 DIAGNOSIS — M65.4 DE QUERVAIN'S SYNDROME (TENOSYNOVITIS): ICD-10-CM

## 2025-06-26 DIAGNOSIS — M18.12 OSTEOARTHRITIS OF LEFT THUMB: Primary | ICD-10-CM

## 2025-06-26 PROCEDURE — 99213 OFFICE O/P EST LOW 20 MIN: CPT | Performed by: NURSE PRACTITIONER

## 2025-06-26 ASSESSMENT — ENCOUNTER SYMPTOMS: RESPIRATORY NEGATIVE: 1

## 2025-06-26 NOTE — PROGRESS NOTES
[facility] PROFESSIONAL [facility] MEDICINE  [address]  [place] [address]  Dept: [phone]  Dept Fax: [phone]  Loc: [phone]    Vanessa Palmer is a 55 y.o. femalewho presents today for her medical conditions/complaints as noted below.  Vanessa Palmeris c/o of Follow-up (Left thumb pain follow up, was doing better on the steroids, starting to hurt again )      :     HPI      Pt presents with a 3 week hx of left base of thumb pain that radiates to left dorsal surface of hand to wrist  Swells at times  Denies redness of the area  It aches at times makes it hard to  change diapers and lift things  Denies N/T of left hand suggestive of carpal tunnel syndrome  Elijah weak hand grasp or dropping things   Does perform repetitive movements at work  Never had pain in her in the past no known injury   Given prednisone at last visit prednisone did help but then symptoms returned as the prednisone wore off  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    COMPARISON: No prior study.    TECHNIQUE: 4 standard views of the left wrist and 2 standard views of the left  hand were obtained.    FINDINGS:    LEFT WRIST: No fracture or dislocation is seen. Mild degenerative changes  involve the greater multangular/first metacarpal joint. No soft tissue swelling  is seen.    LEFT HAND: No fracture or dislocation is seen. No soft tissue swelling is seen.  Mild degenerative changes involve the DIP joint of the little finger and the PIP  joints of the fourth and fifth digits.  Impression: Mild multifocal degenerative changes in the hand and wrist, as  described above.    **This report has been created using voice recognition software.  It may contain  minor errors which are inherent in voice

## 2025-07-06 NOTE — H&P
inhaler 2 sprays, Each Nostril, DAILY       Allergies   Allergen Reactions    Environmental/Seasonal        Review of Systems:   Constitutional: negative for weight changes or fevers  Genitourinary: negative for bowel/bladder incontinence   Musculoskeletal: positive for neck pain, arm pain, low back pain  Neurological: Positive for bilateral arm numbness/tingling  Behavioral/Psych: negative for anxiety/depression   All other systems reviewed and are negative    Objective:     There were no vitals filed for this visit.              Constitutional: Pleasant, no acute distress   Head: Normocephalic, atraumatic   Eyes: Conjunctivae normal   Neck: Supple, symmetrical   Respiration: Non-labored breathing   Cardiovascular: Limbs warm and well perfused   Musculoskeletal: Full cervical ROM in all planes. Positive Spurling maneuver bilaterally. increased pain with facet loading. tenderness to palpation in cervical paraspinals or other posterior neck musculature.    · Lumbar Spine: ROM WNL. Lumbar paraspinals tender to palpation bilaterally. SLR neg bilaterally. ROGER pos bilaterally. GAENSLEN pos bilaterally. positive facet loading bilaterally. Bilateral SI joints non-tender to palpation. SI Distraction maneuver negative on left/right side.   Neurological: Cranial nerves II-XII grossly intact.   · Gait - Normal, non-antalgic gait. Ambulates without assistive device.   Neuro: Alert, oriented. CN II-XII appear grossly intact. Motor strength 5/5 SAb, EF, EE, WE, Radha. LT sensation intact in upper limbs. Biceps/triceps reflexes 2+ and symmetrical. Negative Shaw bilaterally.   Skin: no skin rashes or lesions noted   Psychological: Cooperative, no exaggerated pain behaviors        Assessment:    Diagnosis Orders   1. Facet hypertrophy of cervical region  pregabalin (LYRICA) 75 MG capsule      2. Cervical spondylosis  pregabalin (LYRICA) 75 MG capsule      3. Cervical stenosis of spinal canal  pregabalin (LYRICA) 75 MG capsule

## 2025-07-07 ENCOUNTER — TELEPHONE (OUTPATIENT)
Dept: PHYSICAL MEDICINE AND REHAB | Age: 55
End: 2025-07-07

## 2025-07-07 NOTE — DISCHARGE INSTRUCTIONS
Post procedure Instructions:    No driving or making significant decisions for the remainder of the day.   Be cautions with walking and activity for 24 hours, do not over exert yourself.  Ok to resume pre-procedure activity level today.  Apply ice to site of injection site if you have pain or discomfort.  Do not submerge sit of injection during bath or pool activities for 48 hours post-procedure.  Resume blood thinning medications in 24 hours.     Call office [phone] if you have:  Temperature greater than 100.4  Persistent nausea and vomiting  Severe uncontrolled pain  Redness, tenderness, or signs of infection (pain, swelling, redness, odor or green/yellow discharge around the site)  Difficulty breathing, headache or visual disturbances  Hives  Persistent dizziness or light-headedness  Extreme fatigue  Any other questions or concerns you may have after discharge    In an emergency, call 911 or go to an Emergency Department at a nearby hospital    “Surgical Site Infections”      How can we work together to prevent Surgical Site Infections?   We would like to thank you for choosing [facility] for your Surgical Care.  Below you will find helpful information on how we can work together to prevent Surgical Site Infections.    What is a Surgical Site Infection (SSI)?  A surgical site infection is an infection that occurs after surgery in the part of the body where the surgery took place. Most patients who have surgery do not develop an infection. However, infections develop in about 1 to 3 out of every 100 patients who have surgery.  Some of the common symptoms of a surgical site infection are:  Redness and pain around the area where you had surgery  Drainage of cloudy fluid from your surgical wound  Fever    Can SSIs be treated?  Yes. Most surgical site infections can be treated with antibiotics. The antibiotic given to you depends on the bacteria (germs) causing the infection. Sometimes patients with

## 2025-07-07 NOTE — TELEPHONE ENCOUNTER
Tried to call patient to confirm procedure on 07/08/25 with Dr. Scott but no answer and not able to leave a message.

## 2025-07-08 ENCOUNTER — HOSPITAL ENCOUNTER (OUTPATIENT)
Age: 55
Setting detail: OUTPATIENT SURGERY
Discharge: HOME | End: 2025-07-08
Attending: ANESTHESIOLOGY | Admitting: ANESTHESIOLOGY
Payer: COMMERCIAL

## 2025-07-08 ENCOUNTER — APPOINTMENT (OUTPATIENT)
Dept: GENERAL RADIOLOGY | Age: 55
End: 2025-07-08
Attending: ANESTHESIOLOGY
Payer: COMMERCIAL

## 2025-07-08 VITALS
RESPIRATION RATE: 16 BRPM | HEART RATE: 79 BPM | OXYGEN SATURATION: 98 % | SYSTOLIC BLOOD PRESSURE: 152 MMHG | BODY MASS INDEX: 26.53 KG/M2 | HEIGHT: 67 IN | WEIGHT: 169 LBS | DIASTOLIC BLOOD PRESSURE: 89 MMHG | TEMPERATURE: 97.9 F

## 2025-07-08 PROCEDURE — 6360000002 HC RX W HCPCS: Performed by: ANESTHESIOLOGY

## 2025-07-08 PROCEDURE — 64494 INJ PARAVERT F JNT L/S 2 LEV: CPT | Performed by: ANESTHESIOLOGY

## 2025-07-08 PROCEDURE — 3600000056 HC PAIN LEVEL 4 BASE: Performed by: ANESTHESIOLOGY

## 2025-07-08 PROCEDURE — 64493 INJ PARAVERT F JNT L/S 1 LEV: CPT | Performed by: ANESTHESIOLOGY

## 2025-07-08 PROCEDURE — 7100000010 HC PHASE II RECOVERY - FIRST 15 MIN: Performed by: ANESTHESIOLOGY

## 2025-07-08 PROCEDURE — 2709999900 HC NON-CHARGEABLE SUPPLY: Performed by: ANESTHESIOLOGY

## 2025-07-08 RX ORDER — LIDOCAINE HYDROCHLORIDE 10 MG/ML
INJECTION, SOLUTION EPIDURAL; INFILTRATION; INTRACAUDAL; PERINEURAL PRN
Status: DISCONTINUED | OUTPATIENT
Start: 2025-07-08 | End: 2025-07-08 | Stop reason: ALTCHOICE

## 2025-07-08 RX ORDER — BUPIVACAINE HYDROCHLORIDE 5 MG/ML
INJECTION, SOLUTION PERINEURAL PRN
Status: DISCONTINUED | OUTPATIENT
Start: 2025-07-08 | End: 2025-07-08 | Stop reason: ALTCHOICE

## 2025-07-08 ASSESSMENT — PAIN - FUNCTIONAL ASSESSMENT
PAIN_FUNCTIONAL_ASSESSMENT: NONE - DENIES PAIN
PAIN_FUNCTIONAL_ASSESSMENT: 0-10

## 2025-07-08 ASSESSMENT — PAIN DESCRIPTION - DESCRIPTORS: DESCRIPTORS: ACHING

## 2025-07-08 NOTE — PROCEDURES
Pre-operative Diagnosis: Lumbar spondylosis     Post-operative Diagnosis: Lumbar spondylosis     Procedure: Bilateral lumbar medial branch blocks targeting facet joints of L4/L5 and L5/S1     Procedure Description:  After consent was obtained, the patient was placed in the prone position with a pillow under the abdomen to reduce the lordotic curve of the lumbar spine.  The lower back was prepped with chloraprep and draped in a sterile fashion. Then, 0.5 cc of 1 % lidocaine was used for local anesthesia of the skin and superficial subcutaneous tissues.  Three 22-gauge 3.5-inch spinal needles were advanced under fluoroscopy in an AP view: one at the sacral ala and two at the junction of the right superior articular process and the transverse process of the L4 and L5 vertebrae. There was no paresthesias, heme, or CSF obtained.  Needle placement was confirmed using AP and oblique views. Then, 0.5 cc of 0.5% bupivacaine was injected at each site without complications. The procedure was repeated on the contralateral side. The patient tolerated the procedure well, transported to the recovery room, and discharged in ambulatory fashion.     Procedural Complications: None  Estimated Blood Loss: 0 mL       Ted Scott DO  Interventional Pain Management/PM&R   [facility] [place]

## 2025-07-08 NOTE — PROGRESS NOTES
1021: Patient to phase II. Report and chart received from AQUILINO Call. Patient awake and oriented. Patient denies nausea and pain. Vital signs obtained. Cart in lowest position and locked. Patient given call light.   1025: snack and drink given to patient. Patient getting dressed.   1030: Ride called.   1035: patient meets discharge criteria. Patient ambulated to car passenger seat in stable condition with this RN by side.

## 2025-07-16 ENCOUNTER — OFFICE VISIT (OUTPATIENT)
Dept: PHYSICAL MEDICINE AND REHAB | Age: 55
End: 2025-07-16

## 2025-07-16 VITALS
DIASTOLIC BLOOD PRESSURE: 78 MMHG | WEIGHT: 169.09 LBS | SYSTOLIC BLOOD PRESSURE: 126 MMHG | BODY MASS INDEX: 26.54 KG/M2 | HEIGHT: 67 IN

## 2025-07-16 DIAGNOSIS — M54.12 CERVICAL RADICULOPATHY: ICD-10-CM

## 2025-07-16 DIAGNOSIS — M47.812 FACET HYPERTROPHY OF CERVICAL REGION: Primary | ICD-10-CM

## 2025-07-16 DIAGNOSIS — M54.50 CHRONIC BILATERAL LOW BACK PAIN WITHOUT SCIATICA: ICD-10-CM

## 2025-07-16 DIAGNOSIS — M47.816 FACET HYPERTROPHY OF LUMBAR REGION: ICD-10-CM

## 2025-07-16 DIAGNOSIS — M48.02 CERVICAL STENOSIS OF SPINAL CANAL: ICD-10-CM

## 2025-07-16 DIAGNOSIS — G89.29 CHRONIC BILATERAL LOW BACK PAIN WITHOUT SCIATICA: ICD-10-CM

## 2025-07-16 DIAGNOSIS — M47.812 CERVICAL SPONDYLOSIS: ICD-10-CM

## 2025-07-16 DIAGNOSIS — M79.18 MYOFASCIAL PAIN: ICD-10-CM

## 2025-07-16 DIAGNOSIS — M54.2 CERVICALGIA: ICD-10-CM

## 2025-07-16 DIAGNOSIS — G89.4 CHRONIC PAIN SYNDROME: ICD-10-CM

## 2025-07-16 DIAGNOSIS — M47.816 LUMBAR SPONDYLOSIS: ICD-10-CM

## 2025-07-16 NOTE — PROGRESS NOTES
[facility] PROFESSIONAL SERVS  [facility] NEUROSCIENCE AND REHABILITATION [address]  [facility] [address]  Dept: [phone]  Dept Fax: [phone]  Loc: [phone]    Visit Date: 7/16/2025    Functionality Assessment/Goals Worksheet     On a scale of 0 (Does not Interfere) to 10 (Completely Interferes)     1.  Which number describes how during the past week pain has interfered with       the following:  A.  General Activity:  7  B.  Mood: 5  C.  Walking Ability:  5  D.  Normal Work (Includes both work outside the home and housework):  6  E.  Relations with Other People:   3  F.  Sleep:   7  G.  Enjoyment of Life:   3    2.  Patient Prefers to Take their Pain Medications:     [x]  On a regular basis   []  Only when necessary    []  Does not take pain medications    3.  What are the Patient's Goals/Expectations for Visiting Pain Management?     [x]  Learn about my pain    []  Receive Medication   []  Physical Therapy     []  Treat Depression   []  Receive Injections    []  Treat Sleep   []  Deal with Anxiety and Stress   []  Treat Opoid Dependence/Addiction   []  Other:  
treatment plan outlined above in order to stay compliant with opioid therapy and any deviation from treatment plan will result in cessation of opioid therapy.  Risks of long-term opioid therapy were discussed in extensive detail with the patient and patient understands the risks involved with chronic, continuous opioids.      Patient has been compliant with office visits, rehabilitation plan including attending therapy as warranted, and injection therapy.  Patient has not demonstrated any aberrant behavior with medication.  Pain contract signed and reviewed by patient (8/7/2024).  Patient understands if the contract is violated in any way opioid therapy will be discontinued immediately.  OARRS reviewed and is appropriate.  Naloxone offered to patient.      Interventions:   In presence of lumbar axial back pain and with physical exam consistent for facetal pain, the option of confirmatory medial branch blocks targeting bilateral lumbar 4-5, lumbar 5-sacral 1, under fluoroscopy,  was chosen. The risks and benefits were discussed in detail with the patient. Patient wants to proceed with the injection with Dr Scott    Procedure educations:  Discussed with patient about risks with procedure including infection, reaction to medication, increased pain, failure of procedures, worsening of symptoms, or bleeding.    Anticoagulation/NPO Recommendations:   Patient does need to hold any medications prior to the procedure - Naproxen x 4 days  Patient will not need to be NPO x 8 hours prior to the procedure.  Local    Multidisciplinary Pain Management:   In the presence of complex, chronic, and multi-factorial pain, the importance of a multidisciplinary approach to pain management in the patient’s management regimen was emphasized and discussed in great detail. Discussed compliance in plan of care, medications regimen and appointments in order to continue with clinic and ordered interventions.   PHYSICAL THERAPY: Patient is

## 2025-07-22 ENCOUNTER — TELEPHONE (OUTPATIENT)
Dept: FAMILY MEDICINE CLINIC | Age: 55
End: 2025-07-22

## 2025-07-22 DIAGNOSIS — E04.1 THYROID NODULE: ICD-10-CM

## 2025-07-22 DIAGNOSIS — E01.0 THYROMEGALY: Primary | ICD-10-CM

## 2025-07-22 NOTE — TELEPHONE ENCOUNTER
Pt informed of US of thyroid being due . Pt voiced understanding with no further questions at this time.     Transferred to central scheduling.

## 2025-07-22 NOTE — TELEPHONE ENCOUNTER
----- Message from BLAIR Hermosillo - CNP sent at 7/30/2024 12:15 PM EDT -----  Pt due for u/s thyroid gland for thyromegaly and thyroid nodules , order placed

## 2025-07-24 ENCOUNTER — HOSPITAL ENCOUNTER (OUTPATIENT)
Dept: ULTRASOUND IMAGING | Age: 55
Discharge: HOME | End: 2025-07-24
Payer: COMMERCIAL

## 2025-07-24 DIAGNOSIS — E01.0 THYROMEGALY: ICD-10-CM

## 2025-07-24 DIAGNOSIS — E04.1 THYROID NODULE: ICD-10-CM

## 2025-07-24 PROCEDURE — 76536 US EXAM OF HEAD AND NECK: CPT

## 2025-07-25 ENCOUNTER — RESULTS FOLLOW-UP (OUTPATIENT)
Dept: FAMILY MEDICINE CLINIC | Age: 55
End: 2025-07-25

## 2025-07-25 DIAGNOSIS — E04.1 THYROID NODULE: Primary | ICD-10-CM

## 2025-07-25 DIAGNOSIS — E01.0 THYROMEGALY: ICD-10-CM

## 2025-07-28 ENCOUNTER — TELEPHONE (OUTPATIENT)
Dept: FAMILY MEDICINE CLINIC | Age: 55
End: 2025-07-28

## 2025-07-28 NOTE — TELEPHONE ENCOUNTER
----- Message from BLAIR Hermosillo - CNP sent at 7/25/2025 12:14 PM EDT -----  Let pt know her thyroid gland continues to be enlarged, the previous bx right lob nodule has not changed in size, her left thyroid nodule is 1.6 by 1.4 by 1.2 cm and it is recommended she have a FNA of this nodule to r/o abnormal cells. Will place orders and it is also recommended to repeat U/  S again  in 1 year. Let me know if questions , please transfer to schedule FNA Thanks

## 2025-07-28 NOTE — TELEPHONE ENCOUNTER
Pt informed of results . Pt voiced understanding with no further questions at this time.       Had to leave detailed message on IR scheduling

## 2025-08-06 ENCOUNTER — HOSPITAL ENCOUNTER (OUTPATIENT)
Age: 55
Setting detail: OUTPATIENT SURGERY
Discharge: HOME | End: 2025-08-06
Attending: ANESTHESIOLOGY | Admitting: ANESTHESIOLOGY
Payer: COMMERCIAL

## 2025-08-06 ENCOUNTER — APPOINTMENT (OUTPATIENT)
Dept: GENERAL RADIOLOGY | Age: 55
End: 2025-08-06
Attending: ANESTHESIOLOGY
Payer: COMMERCIAL

## 2025-08-06 VITALS
TEMPERATURE: 96.9 F | RESPIRATION RATE: 16 BRPM | OXYGEN SATURATION: 98 % | HEART RATE: 78 BPM | WEIGHT: 169.6 LBS | SYSTOLIC BLOOD PRESSURE: 153 MMHG | BODY MASS INDEX: 26.62 KG/M2 | HEIGHT: 67 IN | DIASTOLIC BLOOD PRESSURE: 95 MMHG

## 2025-08-06 PROBLEM — M47.816 LUMBAR SPONDYLOSIS: Status: ACTIVE | Noted: 2025-08-06

## 2025-08-06 PROCEDURE — 6360000002 HC RX W HCPCS: Performed by: ANESTHESIOLOGY

## 2025-08-06 PROCEDURE — 64494 INJ PARAVERT F JNT L/S 2 LEV: CPT | Performed by: ANESTHESIOLOGY

## 2025-08-06 PROCEDURE — 3600000056 HC PAIN LEVEL 4 BASE: Performed by: ANESTHESIOLOGY

## 2025-08-06 PROCEDURE — 64493 INJ PARAVERT F JNT L/S 1 LEV: CPT | Performed by: ANESTHESIOLOGY

## 2025-08-06 PROCEDURE — 7100000011 HC PHASE II RECOVERY - ADDTL 15 MIN: Performed by: ANESTHESIOLOGY

## 2025-08-06 PROCEDURE — 2709999900 HC NON-CHARGEABLE SUPPLY: Performed by: ANESTHESIOLOGY

## 2025-08-06 PROCEDURE — 7100000010 HC PHASE II RECOVERY - FIRST 15 MIN: Performed by: ANESTHESIOLOGY

## 2025-08-06 RX ORDER — DEXAMETHASONE SODIUM PHOSPHATE 4 MG/ML
INJECTION, SOLUTION INTRA-ARTICULAR; INTRALESIONAL; INTRAMUSCULAR; INTRAVENOUS; SOFT TISSUE PRN
Status: DISCONTINUED | OUTPATIENT
Start: 2025-08-06 | End: 2025-08-06 | Stop reason: ALTCHOICE

## 2025-08-06 RX ORDER — LIDOCAINE HYDROCHLORIDE 10 MG/ML
INJECTION, SOLUTION EPIDURAL; INFILTRATION; INTRACAUDAL; PERINEURAL PRN
Status: DISCONTINUED | OUTPATIENT
Start: 2025-08-06 | End: 2025-08-06 | Stop reason: ALTCHOICE

## 2025-08-06 RX ORDER — BUPIVACAINE HYDROCHLORIDE 5 MG/ML
INJECTION, SOLUTION PERINEURAL PRN
Status: DISCONTINUED | OUTPATIENT
Start: 2025-08-06 | End: 2025-08-06 | Stop reason: ALTCHOICE

## 2025-08-06 ASSESSMENT — PAIN - FUNCTIONAL ASSESSMENT
PAIN_FUNCTIONAL_ASSESSMENT: NONE - DENIES PAIN
PAIN_FUNCTIONAL_ASSESSMENT: 0-10

## 2025-08-06 ASSESSMENT — PAIN DESCRIPTION - DESCRIPTORS: DESCRIPTORS: ACHING

## 2025-08-08 ENCOUNTER — HOSPITAL ENCOUNTER (OUTPATIENT)
Dept: ULTRASOUND IMAGING | Age: 55
Discharge: HOME | End: 2025-08-08
Payer: COMMERCIAL

## 2025-08-08 DIAGNOSIS — E04.1 THYROID NODULE: ICD-10-CM

## 2025-08-08 DIAGNOSIS — E01.0 THYROMEGALY: ICD-10-CM

## 2025-08-08 PROCEDURE — 76942 ECHO GUIDE FOR BIOPSY: CPT

## 2025-08-12 ENCOUNTER — TELEPHONE (OUTPATIENT)
Dept: FAMILY MEDICINE CLINIC | Age: 55
End: 2025-08-12

## 2025-08-14 ENCOUNTER — OFFICE VISIT (OUTPATIENT)
Dept: PHYSICAL MEDICINE AND REHAB | Age: 55
End: 2025-08-14
Payer: COMMERCIAL

## 2025-08-14 VITALS
SYSTOLIC BLOOD PRESSURE: 126 MMHG | DIASTOLIC BLOOD PRESSURE: 72 MMHG | WEIGHT: 169.53 LBS | BODY MASS INDEX: 26.61 KG/M2 | HEIGHT: 67 IN

## 2025-08-14 DIAGNOSIS — M54.12 CERVICAL RADICULOPATHY: ICD-10-CM

## 2025-08-14 DIAGNOSIS — M79.18 MYOFASCIAL PAIN: ICD-10-CM

## 2025-08-14 DIAGNOSIS — M48.02 CERVICAL STENOSIS OF SPINAL CANAL: ICD-10-CM

## 2025-08-14 DIAGNOSIS — M54.2 CERVICALGIA: ICD-10-CM

## 2025-08-14 DIAGNOSIS — M47.816 LUMBAR SPONDYLOSIS: Primary | ICD-10-CM

## 2025-08-14 DIAGNOSIS — G89.4 CHRONIC PAIN SYNDROME: ICD-10-CM

## 2025-08-14 DIAGNOSIS — M47.812 CERVICAL SPONDYLOSIS: ICD-10-CM

## 2025-08-14 DIAGNOSIS — M47.816 FACET HYPERTROPHY OF LUMBAR REGION: ICD-10-CM

## 2025-08-14 DIAGNOSIS — M47.812 FACET HYPERTROPHY OF CERVICAL REGION: ICD-10-CM

## 2025-08-14 PROCEDURE — 99214 OFFICE O/P EST MOD 30 MIN: CPT | Performed by: NURSE PRACTITIONER

## (undated) DEVICE — SYRINGE MED 10ML TRNSLUC BRL PLUNG BLK MRK POLYPR CTRL

## (undated) DEVICE — SYRINGE MEDICAL 3ML CLEAR PLASTIC STANDARD NON CONTROL LUERLOCK TIP DISPOSABLE

## (undated) DEVICE — SYRINGE MED 5ML STD CLR PLAS LUERLOCK TIP N CTRL DISP

## (undated) DEVICE — NEEDLE SPNL 22GA L3.5IN BLK HUB S STL REG WALL FIT STYL

## (undated) DEVICE — GLOVE ORANGE PI 7   MSG9070

## (undated) DEVICE — TUBING 1895522 5PK STRAIGHTSHOT TO XPS: Brand: STRAIGHTSHOT®

## (undated) DEVICE — COVER,MAYO STAND,STERILE: Brand: MEDLINE

## (undated) DEVICE — CODMAN® SURGICAL PATTIES 1/2" X 3" (1.27CM X 7.62CM): Brand: CODMAN®

## (undated) DEVICE — SOLUTION IV 1000ML 0.9% SOD CHL PH 5 INJ USP VIAFLX PLAS

## (undated) DEVICE — CANISTER, RIGID, 2000CC: Brand: MEDLINE INDUSTRIES, INC.

## (undated) DEVICE — GLOVE ORANGE PI 7 1/2   MSG9075

## (undated) DEVICE — GLOVE ORANGE PI 8 1/2   MSG9085

## (undated) DEVICE — GOWN,SIRUS,NON REINFRCD,LARGE,SET IN SL: Brand: MEDLINE

## (undated) DEVICE — NEEDLE SPNL L3.5IN DIA25GA QNCKE TYP BVL SPINOCAN

## (undated) DEVICE — APPLICATOR MEDICATED 26 CC SOLUTION HI LT ORNG CHLORAPREP

## (undated) DEVICE — YANKAUER,BULB TIP,W/O VENT,RIGID,STERILE: Brand: MEDLINE

## (undated) DEVICE — INTENDED FOR TISSUE SEPARATION, AND OTHER PROCEDURES THAT REQUIRE A SHARP SURGICAL BLADE TO PUNCTURE OR CUT.: Brand: BARD-PARKER ® CARBON RIB-BACK BLADES

## (undated) DEVICE — PATIENT TRACKER 9734887XOM NON-INVASIVE

## (undated) DEVICE — Y-TYPE TUR/BLADDER IRRIGATION SET, REGULATING CLAMP

## (undated) DEVICE — SOLUTION IV IRRIG WATER 1000ML POUR BRL 2F7114

## (undated) DEVICE — BLADE 1884080EM TRICUT 4MMX13CM M4 ROHS: Brand: FUSION®

## (undated) DEVICE — TUBING, SUCTION, 1/4" X 20', STRAIGHT: Brand: MEDLINE INDUSTRIES, INC.

## (undated) DEVICE — CLEANER,CAUTERY TIP,2X2",STERILE: Brand: MEDLINE

## (undated) DEVICE — INSTRUMENT TRACKER 9733533XOM ENT 1PK

## (undated) DEVICE — TRAY CATH 16FR F INCLUDE LUB DRNGE BG STATLOK STBL DEV

## (undated) DEVICE — NEEDLE HYPO 25GA L1.5IN ROBUST SFTY SHLD SELF LEVELING SHTH

## (undated) DEVICE — GLOVE SURG SZ 6 THK91MIL LTX FREE SYN POLYISOPRENE ANTI

## (undated) DEVICE — SOLUTION IV 1000ML LAC RINGERS PH 6.5 INJ USP VIAFLX PLAS

## (undated) DEVICE — HYPODERMIC SAFETY NEEDLE: Brand: MAGELLAN

## (undated) DEVICE — SUREFIT, DUAL DISPERSIVE ELECTRODE, CONTACT QUALITY MONITOR: Brand: SUREFIT

## (undated) DEVICE — SC PAIN PACK: Brand: MEDLINE INDUSTRIES, INC.

## (undated) DEVICE — SINU FOAM: Brand: SINU-FOAM

## (undated) DEVICE — TURBINATOR WAND: Brand: COBLATION

## (undated) DEVICE — Device